# Patient Record
Sex: FEMALE | Race: WHITE
[De-identification: names, ages, dates, MRNs, and addresses within clinical notes are randomized per-mention and may not be internally consistent; named-entity substitution may affect disease eponyms.]

---

## 2017-03-20 ENCOUNTER — HOSPITAL ENCOUNTER (EMERGENCY)
Dept: HOSPITAL 47 - EC | Age: 77
Discharge: HOME | End: 2017-03-20
Payer: COMMERCIAL

## 2017-03-20 VITALS
SYSTOLIC BLOOD PRESSURE: 181 MMHG | DIASTOLIC BLOOD PRESSURE: 79 MMHG | RESPIRATION RATE: 18 BRPM | HEART RATE: 93 BPM | TEMPERATURE: 99.1 F
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
## 2017-03-20 DIAGNOSIS — Z88.8: ICD-10-CM

## 2017-03-20 DIAGNOSIS — Z79.899: ICD-10-CM
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
## 2017-03-20 DIAGNOSIS — Z23: ICD-10-CM

## 2017-03-20 DIAGNOSIS — S61.215A: Primary | ICD-10-CM

## 2017-03-20 DIAGNOSIS — Z88.1: ICD-10-CM

## 2017-03-20 PROCEDURE — 90715 TDAP VACCINE 7 YRS/> IM: CPT

## 2017-03-20 PROCEDURE — 90471 IMMUNIZATION ADMIN: CPT

## 2017-03-20 PROCEDURE — 99282 EMERGENCY DEPT VISIT SF MDM: CPT

## 2017-03-20 PROCEDURE — 12001 RPR S/N/AX/GEN/TRNK 2.5CM/<: CPT

## 2017-03-20 NOTE — ED
Wound/Laceration HPI





- General


Chief Complaint: Wound/Laceration


Stated Complaint: laceration


Time Seen by Provider: 03/20/17 13:08


Source: patient, RN notes reviewed


Mode of arrival: ambulatory


Limitations: no limitations





- History of Present Illness


Initial Comments: 





Patient is a 76-year-old female presents emergency room for evaluation of left 

finger laceration.  Patient states she was cutting watermelon and sliced her 

finger.  Patient denies any significant pain.  Patient denies any numbness or 

tingling in her finger.  Patient states she still has full range of motion of 

her finger.  Patient is not sure when her last tetanus vaccine was.  Patient 

denies any other injuries during incident.  Patient denies taking blood 

thinners.





- Related Data


 Home Medications











 Medication  Instructions  Recorded  Confirmed


 


Ascorbic Acid [Vitamin C] 500 mg PO DAILY 09/01/16 10/28/16


 


Multivit with Calcium,Iron,Min 1 each PO DAILY 09/01/16 10/28/16





[Women's Daily Multivitamin]   


 


Tums   1 tab PO AS DIRECTED PRN 10/28/16 10/28/16


 


Vitamin D (Unknown Dose) 1 cap PO DAILY 10/28/16 10/28/16











 Allergies











Allergy/AdvReac Type Severity Reaction Status Date / Time


 


levofloxacin [From Levaquin] Allergy  Rash/Hives Verified 10/28/16 13:32


 


Additives in inhalers Allergy  Rapid Uncoded 10/28/16 13:32





   Heart Rate  














Review of Systems


ROS Statement: 


Those systems with pertinent positive or pertinent negative responses have been 

documented in the HPI.





ROS Other: All systems not noted in ROS Statement are negative.





Past Medical History


Past Medical History: GERD/Reflux, Osteoarthritis (OA)


Additional Past Medical History / Comment(s): Seasonal allergies, Hiatal Hernia

, episode of tachycardia., neck pain in certain positions.


History of Any Multi-Drug Resistant Organisms: None Reported


Past Surgical History: Hysterectomy


Additional Past Surgical History / Comment(s): Colonoscopy, right cataract


Past Anesthesia/Blood Transfusion Reactions: Family History of Problems w/ 

Anesthesia


Additional Past Anesthesia/Blood Transfusion Reaction / Comment(s): States the 

top of her head was numb after cataract surgery.   Pt. states her mother's 

heart stopped during gallbladder surgery.


Past Psychological History: No Psychological Hx Reported


Smoking Status: Never smoker


Past Alcohol Use History: None Reported


Past Drug Use History: None Reported





- Past Family History


  ** Mother


Family Medical History: Coronary Artery Disease (CAD)





General Exam





- General Exam Comments


Initial Comments: 





Sitting in exam room in no acute distress.


Limitations: no limitations


General appearance: alert, in no apparent distress


Head exam: Present: atraumatic, normocephalic, normal inspection


Eye exam: Present: normal appearance


ENT exam: Present: normal exam


Neck exam: Present: normal inspection


Respiratory exam: Present: normal lung sounds bilaterally.  Absent: respiratory 

distress


Cardiovascular Exam: Present: regular rate, normal rhythm, normal heart sounds


  ** Left


Hand Wrist exam: Present: full ROM, laceration (2 cm open laceration on the 

palmar portion of the proximal phalanx of the fourth digit).  Absent: tenderness


Neuro motor exam: Present: wrist extension intact, thumb opposition intact, 

thumb IP flexion intact, thumb adduction intact, fingers 2-5 abduction intact


Vascular: Present: normal capillary refill (Capillary refill less than 2 seconds

), radial pulse (2+), ulnar pulse (2+)


Back exam: Present: normal inspection


Neurological exam: Present: alert, oriented X3, CN II-XII intact, normal gait


Psychiatric exam: Present: normal affect, normal mood


Skin exam: Present: warm, dry.  Absent: rash





Course


 Vital Signs











  03/20/17





  12:40


 


Temperature 99.1 F


 


Pulse Rate 93


 


Respiratory 18





Rate 


 


Blood Pressure 181/79


 


O2 Sat by Pulse 98





Oximetry 














Procedures





- Laceration


  ** Laceration #1


Consent Obtained: verbal consent


Indication: laceration


Site: other (left fourth digit)


Size (cm): 2


Description: linear


Depth: simple, single layer


Anesthetic Used: lidocaine 1%


Anesthesia Technique: local infiltration


Amount (mls): 1


Pre-repair: wound explored, irrigated extensively


Type of Sutures: nylon


Size of Sutures: 5-0


Number of Sutures: 7


Technique: simple, interrupted


Patient Tolerated Procedure: well, no complications





Medical Decision Making





- Medical Decision Making





Patient is 76-year-old female since emergency room for violation finger 

laceration.  Laceration repaired with sutures.  Patient updated on her tetanus 

vaccine.  Advised patient to return in 10-12 days for suture removal.  Patient 

has full range of motion of finger.  Strength intact.  Patient states she 

understand everything that was discussed with her.  Return parameters 

discussed.  Case discussed with Dr. Gonzalez.





Disposition


Clinical Impression: 


 Finger laceration





Disposition: HOME SELF-CARE


Condition: Good


Instructions:  Care For Your Stitches (ED), Laceration (ED)


Additional Instructions: 


Keep suture area clean and dry.  Clean suture area with a damp cloth.  Please 

return in 10-12 days for suture removal.  Take Tylenol or Motrin as needed for 

discomfort. Please follow up with primary care provider in 1-2 days. If any new 

symptom arises or symptoms worsen, return to ER as soon as possible.  


Referrals: 


RAYNA Fowler III, MD [Primary Care Provider] - 1-2 days


Time of Disposition: 14:31

## 2017-07-12 ENCOUNTER — HOSPITAL ENCOUNTER (OUTPATIENT)
Dept: HOSPITAL 47 - LABWHC1 | Age: 77
Discharge: HOME | End: 2017-07-12
Payer: MEDICARE

## 2017-07-12 DIAGNOSIS — Z48.24: ICD-10-CM

## 2017-07-12 DIAGNOSIS — R10.11: Primary | ICD-10-CM

## 2017-07-12 DIAGNOSIS — R35.0: ICD-10-CM

## 2017-07-12 DIAGNOSIS — S39.012A: ICD-10-CM

## 2017-07-12 DIAGNOSIS — N39.0: ICD-10-CM

## 2017-07-12 LAB
ALP SERPL-CCNC: 75 U/L (ref 38–126)
ALT SERPL-CCNC: 20 U/L (ref 9–52)
ANION GAP SERPL CALC-SCNC: 12 MMOL/L
AST SERPL-CCNC: 26 U/L (ref 14–36)
BASOPHILS # BLD AUTO: 0 K/UL (ref 0–0.2)
BASOPHILS NFR BLD AUTO: 1 %
BUN SERPL-SCNC: 14 MG/DL (ref 7–17)
CALCIUM SPEC-MCNC: 9.2 MG/DL (ref 8.4–10.2)
CH: 30.6
CHCM: 32.9
CHLORIDE SERPL-SCNC: 106 MMOL/L (ref 98–107)
CHOLEST SERPL-MCNC: 225 MG/DL (ref ?–200)
CO2 SERPL-SCNC: 23 MMOL/L (ref 22–30)
EOSINOPHIL # BLD AUTO: 0.1 K/UL (ref 0–0.7)
EOSINOPHIL NFR BLD AUTO: 2 %
ERYTHROCYTE [DISTWIDTH] IN BLOOD BY AUTOMATED COUNT: 4.49 M/UL (ref 3.8–5.4)
ERYTHROCYTE [DISTWIDTH] IN BLOOD: 13.9 % (ref 11.5–15.5)
GLUCOSE SERPL-MCNC: 88 MG/DL (ref 74–99)
HCT VFR BLD AUTO: 42 % (ref 34–46)
HDLC SERPL-MCNC: 65 MG/DL (ref 40–60)
HDW: 2.14
HGB BLD-MCNC: 14.3 GM/DL (ref 11.4–16)
LUC NFR BLD AUTO: 3 %
LYMPHOCYTES # SPEC AUTO: 1 K/UL (ref 1–4.8)
LYMPHOCYTES NFR SPEC AUTO: 24 %
MCH RBC QN AUTO: 31.8 PG (ref 25–35)
MCHC RBC AUTO-ENTMCNC: 34 G/DL (ref 31–37)
MCV RBC AUTO: 93.6 FL (ref 80–100)
MONOCYTES # BLD AUTO: 0.5 K/UL (ref 0–1)
MONOCYTES NFR BLD AUTO: 11 %
NEUTROPHILS # BLD AUTO: 2.4 K/UL (ref 1.3–7.7)
NEUTROPHILS NFR BLD AUTO: 59 %
NON-AFRICAN AMERICAN GFR(MDRD): >60
POTASSIUM SERPL-SCNC: 4.9 MMOL/L (ref 3.5–5.1)
PROT SERPL-MCNC: 7.3 G/DL (ref 6.3–8.2)
SODIUM SERPL-SCNC: 141 MMOL/L (ref 137–145)
TRIGL SERPL-MCNC: 83 MG/DL (ref ?–150)
WBC # BLD AUTO: 0.13 10*3/UL
WBC # BLD AUTO: 4.1 K/UL (ref 3.8–10.6)
WBC (PEROX): 4.13

## 2017-07-12 PROCEDURE — 80061 LIPID PANEL: CPT

## 2017-07-12 PROCEDURE — 36415 COLL VENOUS BLD VENIPUNCTURE: CPT

## 2017-07-12 PROCEDURE — 85025 COMPLETE CBC W/AUTO DIFF WBC: CPT

## 2017-07-12 PROCEDURE — 80053 COMPREHEN METABOLIC PANEL: CPT

## 2017-07-18 ENCOUNTER — HOSPITAL ENCOUNTER (OUTPATIENT)
Dept: HOSPITAL 47 - RADUSWWP | Age: 77
Discharge: HOME | End: 2017-07-18
Payer: MEDICARE

## 2017-07-18 DIAGNOSIS — R10.11: Primary | ICD-10-CM

## 2017-07-18 PROCEDURE — 76705 ECHO EXAM OF ABDOMEN: CPT

## 2017-07-18 NOTE — US
EXAMINATION TYPE: US abdomen limited

 

DATE OF EXAM: 7/18/2017

 

COMPARISON: CT from 2012

 

CLINICAL HISTORY: RUQ Pain R10.11.

 

EXAM MEASUREMENTS:

 

Liver Length:  10.9 cm   

Gallbladder Wall:  0.2 cm   

CBD:  0.4 cm

Right Kidney:  10.6 x 3.6 x 5.5 cm

 

Pancreas:  visualized portions wnl

Liver:  wnl  

Gallbladder:  No stones seen

**Evidence for sonographic Light's sign:  No

CBD:  wnl 

Right Kidney:  No hydronephrosis or masses seen as visualized, lower pole obscured by bowel 

 

Limited views of the pancreas are unremarkable.

 

The liver is normal in size without biliary dilatation.

 

The gallbladder is normal without evidence of cholelithiasis. The distal common hepatic duct measures
 4 mm. The gallbladder wall measures 2 mm. There is no sonographic Light's sign.

 

The right kidney is unremarkable.

 

IMPRESSION: 

 

NORMAL RIGHT UPPER QUADRANT ULTRASOUND.

## 2017-07-26 ENCOUNTER — HOSPITAL ENCOUNTER (INPATIENT)
Dept: HOSPITAL 47 - EC | Age: 77
LOS: 1 days | Discharge: HOME | DRG: 310 | End: 2017-07-27
Attending: HOSPITALIST | Admitting: HOSPITALIST
Payer: MEDICARE

## 2017-07-26 VITALS — RESPIRATION RATE: 16 BRPM

## 2017-07-26 DIAGNOSIS — Z88.8: ICD-10-CM

## 2017-07-26 DIAGNOSIS — R07.89: ICD-10-CM

## 2017-07-26 DIAGNOSIS — M19.90: ICD-10-CM

## 2017-07-26 DIAGNOSIS — Z82.49: ICD-10-CM

## 2017-07-26 DIAGNOSIS — E87.8: ICD-10-CM

## 2017-07-26 DIAGNOSIS — I11.0: ICD-10-CM

## 2017-07-26 DIAGNOSIS — E66.9: ICD-10-CM

## 2017-07-26 DIAGNOSIS — Z88.1: ICD-10-CM

## 2017-07-26 DIAGNOSIS — K44.9: ICD-10-CM

## 2017-07-26 DIAGNOSIS — E11.9: ICD-10-CM

## 2017-07-26 DIAGNOSIS — K21.9: ICD-10-CM

## 2017-07-26 DIAGNOSIS — I50.9: ICD-10-CM

## 2017-07-26 DIAGNOSIS — I48.91: Primary | ICD-10-CM

## 2017-07-26 LAB
ALP SERPL-CCNC: 81 U/L (ref 38–126)
ALT SERPL-CCNC: 42 U/L (ref 9–52)
ANION GAP SERPL CALC-SCNC: 11 MMOL/L
APTT BLD: 22.6 SEC (ref 22–30)
AST SERPL-CCNC: 33 U/L (ref 14–36)
BASOPHILS # BLD AUTO: 0 K/UL (ref 0–0.2)
BASOPHILS NFR BLD AUTO: 1 %
BUN SERPL-SCNC: 21 MG/DL (ref 7–17)
CALCIUM SPEC-MCNC: 8.9 MG/DL (ref 8.4–10.2)
CH: 31.1
CHCM: 33.1
CHLORIDE SERPL-SCNC: 109 MMOL/L (ref 98–107)
CK SERPL-CCNC: 101 U/L (ref 30–135)
CK SERPL-CCNC: 55 U/L (ref 30–135)
CK SERPL-CCNC: 78 U/L (ref 30–135)
CO2 SERPL-SCNC: 24 MMOL/L (ref 22–30)
EOSINOPHIL # BLD AUTO: 0.1 K/UL (ref 0–0.7)
EOSINOPHIL NFR BLD AUTO: 2 %
ERYTHROCYTE [DISTWIDTH] IN BLOOD BY AUTOMATED COUNT: 4.5 M/UL (ref 3.8–5.4)
ERYTHROCYTE [DISTWIDTH] IN BLOOD: 14.1 % (ref 11.5–15.5)
GLUCOSE SERPL-MCNC: 100 MG/DL (ref 74–99)
HCT VFR BLD AUTO: 42.5 % (ref 34–46)
HDW: 2.15
HGB BLD-MCNC: 14.4 GM/DL (ref 11.4–16)
INR PPP: 1 (ref ?–1.2)
LUC NFR BLD AUTO: 4 %
LYMPHOCYTES # SPEC AUTO: 1.4 K/UL (ref 1–4.8)
LYMPHOCYTES NFR SPEC AUTO: 27 %
MAGNESIUM SPEC-SCNC: 1.8 MG/DL (ref 1.6–2.3)
MCH RBC QN AUTO: 32 PG (ref 25–35)
MCHC RBC AUTO-ENTMCNC: 33.9 G/DL (ref 31–37)
MCV RBC AUTO: 94.5 FL (ref 80–100)
MONOCYTES # BLD AUTO: 0.5 K/UL (ref 0–1)
MONOCYTES NFR BLD AUTO: 9 %
NEUTROPHILS # BLD AUTO: 2.9 K/UL (ref 1.3–7.7)
NEUTROPHILS NFR BLD AUTO: 57 %
NON-AFRICAN AMERICAN GFR(MDRD): >60
PHOSPHATE SERPL-MCNC: 3.6 MG/DL (ref 2.5–4.5)
POTASSIUM SERPL-SCNC: 3.7 MMOL/L (ref 3.5–5.1)
PROT SERPL-MCNC: 6.9 G/DL (ref 6.3–8.2)
PT BLD: 10.2 SEC (ref 9–12)
SODIUM SERPL-SCNC: 144 MMOL/L (ref 137–145)
TROPONIN I SERPL-MCNC: 0.01 NG/ML (ref 0–0.03)
TROPONIN I SERPL-MCNC: <0.012 NG/ML (ref 0–0.03)
TROPONIN I SERPL-MCNC: <0.012 NG/ML (ref 0–0.03)
WBC # BLD AUTO: 0.19 10*3/UL
WBC # BLD AUTO: 5 K/UL (ref 3.8–10.6)
WBC (PEROX): 4.74

## 2017-07-26 PROCEDURE — 85025 COMPLETE CBC W/AUTO DIFF WBC: CPT

## 2017-07-26 PROCEDURE — 84443 ASSAY THYROID STIM HORMONE: CPT

## 2017-07-26 PROCEDURE — 93306 TTE W/DOPPLER COMPLETE: CPT

## 2017-07-26 PROCEDURE — 94760 N-INVAS EAR/PLS OXIMETRY 1: CPT

## 2017-07-26 PROCEDURE — 85610 PROTHROMBIN TIME: CPT

## 2017-07-26 PROCEDURE — 85730 THROMBOPLASTIN TIME PARTIAL: CPT

## 2017-07-26 PROCEDURE — 82553 CREATINE MB FRACTION: CPT

## 2017-07-26 PROCEDURE — 96368 THER/DIAG CONCURRENT INF: CPT

## 2017-07-26 PROCEDURE — 96376 TX/PRO/DX INJ SAME DRUG ADON: CPT

## 2017-07-26 PROCEDURE — 83735 ASSAY OF MAGNESIUM: CPT

## 2017-07-26 PROCEDURE — 96365 THER/PROPH/DIAG IV INF INIT: CPT

## 2017-07-26 PROCEDURE — 96366 THER/PROPH/DIAG IV INF ADDON: CPT

## 2017-07-26 PROCEDURE — 80053 COMPREHEN METABOLIC PANEL: CPT

## 2017-07-26 PROCEDURE — 93005 ELECTROCARDIOGRAM TRACING: CPT

## 2017-07-26 PROCEDURE — 82550 ASSAY OF CK (CPK): CPT

## 2017-07-26 PROCEDURE — 84484 ASSAY OF TROPONIN QUANT: CPT

## 2017-07-26 PROCEDURE — 36415 COLL VENOUS BLD VENIPUNCTURE: CPT

## 2017-07-26 PROCEDURE — 85049 AUTOMATED PLATELET COUNT: CPT

## 2017-07-26 PROCEDURE — 84100 ASSAY OF PHOSPHORUS: CPT

## 2017-07-26 PROCEDURE — 80061 LIPID PANEL: CPT

## 2017-07-26 PROCEDURE — 99291 CRITICAL CARE FIRST HOUR: CPT

## 2017-07-26 RX ADMIN — APIXABAN SCH MG: 5 TABLET, FILM COATED ORAL at 21:05

## 2017-07-26 RX ADMIN — APIXABAN SCH MG: 5 TABLET, FILM COATED ORAL at 14:52

## 2017-07-26 RX ADMIN — METOPROLOL TARTRATE SCH MG: 50 TABLET, FILM COATED ORAL at 21:05

## 2017-07-26 NOTE — P.CRDCN
History of Present Illness


Consult date: 07/26/17


History of present illness: 


76-year-old female with no significant past medical history except intermittent 

palpitations and " tachycardias", woke up around 5:00 last night with 

complaints of palpitations.  Patient has some chest tightness and shortness of 

breath.  Patient was given IV Cardizem  and she converted back to sinus rhythm.

  Patient is currently maintaining sinus rhythm and is feeling well.  She is on 

IV heparin.  She is being considered for Eliquis.  Her cardiac enzymes are 

negative.  Echocardiogram was done but results are pending at this time.  She 

doesn't seem to be in acute distress.








Review of Systems





REVIEW OF SYSTEMS:


CONSTITUTIONAL:.  Patient is doing well. No complaints of fever or chills


EYES: Denies diplopia, blurring of vision


EARS, NOSE, MOUTH, THROAT: Denies headaches, denies sore throat.


CARDIOVASCULAR: As per HPI


RESPIRATORY: Denies shortness of breath, denies cough.


GASTROINTESTINAL: Denies change in appetite, denies abdominal pain, denies 

diarrhea


GENITOURINARY: Denies hematuria, denies infections.


MUSKULOSKELETAL: Denies pain, denies swelling.  Denies any cramps or 

claudication


INTEGUMENTARY: Denies rash, denies eczema.


NEUROLOGICAL:  Denies  focal weakness, or visual disturbance.  Denies any 

dizziness or syncope


PSYCHIATRIC: Denies anxiety, denies depression.


HEMATOLOGIC/LYMPHATIC: Denies any bleeding, denies enlarged lymph nodes.








Past Medical History


Past Medical History: GERD/Reflux, Osteoarthritis (OA)


Additional Past Medical History / Comment(s): Seasonal allergies, Hiatal Hernia

, episode of tachycardia., neck pain in certain positions.boipsy on right foot 

and under left breast


History of Any Multi-Drug Resistant Organisms: None Reported


Past Surgical History: Hysterectomy


Additional Past Surgical History / Comment(s): Colonoscopy,  cataract removed 

for both eyes


Past Anesthesia/Blood Transfusion Reactions: Family History of Problems w/ 

Anesthesia


Additional Past Anesthesia/Blood Transfusion Reaction / Comment(s): States the 

top of her head was numb after cataract surgery.   Pt. states her mother's 

heart stopped during gallbladder surgery.


Smoking Status: Never smoker





- Past Family History


  ** Mother


Family Medical History: Coronary Artery Disease (CAD)





Medications and Allergies


 Home Medications











 Medication  Instructions  Recorded  Confirmed  Type


 


Multivit with Calcium,Iron,Min 1 tab PO DAILY 09/01/16 07/26/17 History





[Women's Daily Multivitamin]    


 


Areds 2 Vitamin 1 tab PO Q7D 07/26/17 07/26/17 History


 


Cholecalciferol [Vitamin D3] 1,000 unit PO DAILY 07/26/17 07/26/17 History











 Allergies











Allergy/AdvReac Type Severity Reaction Status Date / Time


 


levofloxacin [From Levaquin] Allergy  Rash/Hives Verified 07/26/17 07:29


 


Additives in inhalers Allergy  Rapid Uncoded 07/26/17 06:52





   Heart Rate  














Physical Exam


Vitals: 


 Vital Signs











  Temp Pulse Pulse Resp BP BP Pulse Ox


 


 07/26/17 14:00  97 F L   69  16   123/58  99


 


 07/26/17 13:15  98.9 F  70   18  119/60   97


 


 07/26/17 12:26  98.8 F  60   18  110/74   97


 


 07/26/17 11:26   62   18  122/60   62 L


 


 07/26/17 10:46   78   18  109/74   97


 


 07/26/17 10:26   82   18  128/59   100


 


 07/26/17 10:06   83   18  128/63   99


 


 07/26/17 09:46   88   18  121/57   98


 


 07/26/17 09:26   78   18  119/58   100


 


 07/26/17 09:06  98.0 F  98   18  117/58   99


 


 07/26/17 08:46   98   18  110/80   98


 


 07/26/17 08:26   98   18  103/52   100


 


 07/26/17 08:16   113 H   18  135/68   100


 


 07/26/17 08:12   120 H   18  132/70   99


 


 07/26/17 08:01   118 H   18  138/64   100


 


 07/26/17 07:56   112 H   18  138/70   100


 


 07/26/17 07:51   120 H   18  126/64   100


 


 07/26/17 07:46   114 H   18  133/77   100


 


 07/26/17 07:40   130 H     


 


 07/26/17 07:39   100   18  127/57   100


 


 07/26/17 07:35   128 H   18  110/58   100


 


 07/26/17 07:30   120 H   18  132/64   100


 


 07/26/17 07:16   114 H   20  129/60   100


 


 07/26/17 07:15   135 H   20  162/55   100


 


 07/26/17 07:13   142 H   20  158/82   99


 


 07/26/17 06:58  98.0 F  140 H   18  150/74   96


 


 07/26/17 06:55     17   


 


 07/26/17 06:49  97.8 F  128 H   18  189/90   100








 Intake and Output











 07/26/17 07/26/17 07/26/17





 06:59 14:59 22:59


 


Intake Total  89.529 


 


Balance  89.529 


 


Intake:   


 


  Intake, IV Titration  89.529 





  Amount   


 


    Diltiazem 125 mg In  9.833 





    Sodium Chloride 0.9% 100   





    ml @ 5 MG/HR 5 mls/hr IV   





    .Q24H ONE Rx#:[id number]   


 


    Heparin Sodium,Porcine/  79.696 





    D5w Pmx 25,000 unit In   





    Dextrose/Water 1 500ml.   





    bag @ 12 UNITS/KG/HR 16.   





    32 mls/hr IV .Q24H [facility] Rx   





    #:[id number]   


 


Other:   


 


  Voiding Method  Toilet 


 


  Weight 68.039 kg  














GENERAL EXAM: Patient is alert and oriented and doesn't appear to be in any 

acute distress


HEENT: Normocephalic. Normal reaction of pupils, equal size, normal range of 

extraocular motion. No erythema or exudates in the throat.


NECK: No masses, no nuchal rigidity.


CHEST: No chest wall deformity.


LUNGS: Equal air entry with no crackles or wheeze.


HEART: S1 and S2 normal with no audible mumurs or gallops. Regular rhythm, 

femorals equal on both sides..


ABDOMEN: No hepatosplenomegaly, normal bowel sounds, no guarding or rigidity.


SKIN: No rashes


CENTRAL NERVOUS SYSTEM: No focal deficits.


EXTREMITIES: No cyanosis, clubbing or edema.





Results





 07/26/17 07:09





 07/26/17 07:09


 Cardiac Enzymes











  07/26/17 07/26/17 07/26/17 Range/Units





  07:09 07:09 13:17 


 


AST   33   (14-36)  U/L


 


CK-MB (CK-2)  1.2   1.1  (0.0-2.4)  ng/mL


 


Troponin I  <0.012   0.015  (0.000-0.034)  ng/mL








 Coagulation











  07/26/17 07/26/17 Range/Units





  07:09 13:17 


 


PT  10.2   (9.0-12.0)  sec


 


APTT  22.6  59.2 H  (22.0-30.0)  sec








 CBC











  07/26/17 Range/Units





  07:09 


 


WBC  5.0  (3.8-10.6)  k/uL


 


RBC  4.50  (3.80-5.40)  m/uL


 


Hgb  14.4  (11.4-16.0)  gm/dL


 


Hct  42.5  (34.0-46.0)  %


 


Plt Count  201  (150-450)  k/uL








 Comprehensive Metabolic Panel











  07/26/17 Range/Units





  07:09 


 


Sodium  144  (137-145)  mmol/L


 


Potassium  3.7  (3.5-5.1)  mmol/L


 


Chloride  109 H  ()  mmol/L


 


Carbon Dioxide  24  (22-30)  mmol/L


 


BUN  21 H  (7-17)  mg/dL


 


Creatinine  0.68  (0.52-1.04)  mg/dL


 


Glucose  100 H  (74-99)  mg/dL


 


Calcium  8.9  (8.4-10.2)  mg/dL


 


AST  33  (14-36)  U/L


 


ALT  42  (9-52)  U/L


 


Alkaline Phosphatase  81  ()  U/L


 


Total Protein  6.9  (6.3-8.2)  g/dL


 


Albumin  4.1  (3.5-5.0)  g/dL








 Current Medications











Generic Name Dose Route Start Last Admin





  Trade Name Freq  PRN Reason Stop Dose Admin


 


Apixaban  5 mg  07/26/17 21:00  07/26/17 14:52





  Eliquis  PO   5 mg





  BID PARISA   Administration


 


Aspirin  325 mg  07/27/17 09:00  





  Aspirin  PO   





  DAILY [facility]   


 


Atorvastatin Calcium  80 mg  07/27/17 09:00  





  Lipitor  PO   





  DAILY [facility]   


 


Sodium Chloride  1,000 mls @ 100 mls/hr  07/26/17 07:19  07/26/17 07:26





  Saline 0.9%  IV  07/26/17 17:18  100 mls/hr





  .Q10H [facility]   Administration


 


Metoprolol Tartrate  50 mg  07/26/17 21:00  





  Lopressor  PO   





  BID [facility]   


 


Nitroglycerin  0.4 mg  07/26/17 09:19  





  Nitrostat  SUBLINGUAL   





  Q5M PRN   





  Chest Pain   








 Intake and Output











 07/26/17 07/26/17 07/26/17





 06:59 14:59 22:59


 


Intake Total  89.529 


 


Balance  89.529 


 


Intake:   


 


  Intake, IV Titration  89.529 





  Amount   


 


    Diltiazem 125 mg In  9.833 





    Sodium Chloride 0.9% 100   





    ml @ 5 MG/HR 5 mls/hr IV   





    .Q24H [facility] Rx#:[id number]   


 


    Heparin Sodium,Porcine/  79.696 





    D5w Pmx 25,000 unit In   





    Dextrose/Water 1 500ml.   





    bag @ 12 UNITS/KG/HR 16.   





    32 mls/hr IV .Q24H [facility] Rx   





    #:[id number]   


 


Other:   


 


  Voiding Method  Toilet 


 


  Weight 68.039 kg  








 





 07/26/17 07:09 





 07/26/17 07:09 











EKG Interpretations (text)





Atrial fibrillation with rapid ventricular response





Assessment and Plan


(1) Atrial fibrillation with RVR


Status: Acute   





(2) Chest pain


Status: Acute   


Plan: 


Patient is maintaining sinus rhythm at this time and is feeling well.  We'll 

wait for the reports of the echocardiogram.  If patient remains stable she 

could be discharged home tomorrow on metoprolol and anticoagulant . Follow-up 

in the office in one week.

## 2017-07-26 NOTE — P.HPIM
History of Present Illness





76-year-old female came in with compensative heart racing and palpitations, and 

chest pressure like sensation patient does not have any significant past 

medical history.  Patient is found to be in atrial fibrillation presently sinus 

rhythm on 20 20 mg IV piggyback off Cardizem.  Patient had chest pressure like 

sensation.  Patient was treated for urinary tract infection last week.  Patient 

denied any fever presently patient denied any specific pain dysuria.  Patient 

denied any nausea vomiting.  Patient is not dehydrated at this point of time 

creatinine within normal limits.  And patient is being switched to metoprolol 

50 twice a day, will check for eliquis  approval, she is approved 

eliquispatient will be discharged on eliquis.  Echo cardiac exam will be 

obtained to assess LV function and any valvular abnormalities.  Patient denied 

any shortness of breath orthopnea or PND.





Review of Systems





REVIEW OF SYSTEMS: 


CONSTITUTIONAL: No fever, no malaise, no fatigue. 


HEENT: No recent visual problems or hearing problems. Denied any sore throat. 


CARDIOVASCULAR: No orthopnea, PND, , no syncope. 


PULMONARY: No shortness of breath, no cough, no hemoptysis. 


GASTROINTESTINAL: No diarrhea, no nausea, no vomiting, no abdominal pain. 

Normoactive bowel sounds. 


NEUROLOGICAL: No headaches, no weakness, no numbness. 


HEMATOLOGICAL: Denies any bleeding or petechiae. 


GENITOURINARY: Denies any burning micturition, frequency, or urgency. 


MUSCULOSKELETAL/RHEUMATOLOGICAL: Denies any joint pain, swelling, or any muscle 

pain. 


ENDOCRINE: Denies any polyuria or polydipsia. 





The rest of the 14-point review of systems is negative.











Past Medical History


Past Medical History: GERD/Reflux, Osteoarthritis (OA)


Additional Past Medical History / Comment(s): Seasonal allergies, Hiatal Hernia

, episode of tachycardia., neck pain in certain positions.


History of Any Multi-Drug Resistant Organisms: None Reported


Past Surgical History: Hysterectomy


Additional Past Surgical History / Comment(s): Colonoscopy, right cataract


Past Anesthesia/Blood Transfusion Reactions: Family History of Problems w/ 

Anesthesia


Additional Past Anesthesia/Blood Transfusion Reaction / Comment(s): States the 

top of her head was numb after cataract surgery.   Pt. states her mother's 

heart stopped during gallbladder surgery.


Past Psychological History: No Psychological Hx Reported


Smoking Status: Never smoker


Past Alcohol Use History: None Reported


Past Drug Use History: None Reported





- Past Family History


  ** Mother


Family Medical History: Coronary Artery Disease (CAD)





Medications and Allergies


 Home Medications











 Medication  Instructions  Recorded  Confirmed  Type


 


Multivit with Calcium,Iron,Min 1 tab PO DAILY 09/01/16 07/26/17 History





[Women's Daily Multivitamin]    


 


Areds 2 Vitamin 1 tab PO Q7D 07/26/17 07/26/17 History


 


Cholecalciferol [Vitamin D3] 1,000 unit PO DAILY 07/26/17 07/26/17 History











 Allergies











Allergy/AdvReac Type Severity Reaction Status Date / Time


 


levofloxacin [From Levaquin] Allergy  Rash/Hives Verified 07/26/17 07:29


 


Additives in inhalers Allergy  Rapid Uncoded 07/26/17 06:52





   Heart Rate  














Physical Exam


Vitals: 


 Vital Signs











  Temp Pulse Resp BP Pulse Ox


 


 07/26/17 13:15  98.9 F  70  18  119/60  97


 


 07/26/17 12:26  98.8 F  60  18  110/74  97


 


 07/26/17 11:26   62  18  122/60  62 L


 


 07/26/17 10:46   78  18  109/74  97


 


 07/26/17 10:26   82  18  128/59  100


 


 07/26/17 10:06   83  18  128/63  99


 


 07/26/17 09:46   88  18  121/57  98


 


 07/26/17 09:26   78  18  119/58  100


 


 07/26/17 09:06  98.0 F  98  18  117/58  99


 


 07/26/17 08:46   98  18  110/80  98


 


 07/26/17 08:26   98  18  103/52  100


 


 07/26/17 08:16   113 H  18  135/68  100


 


 07/26/17 08:12   120 H  18  132/70  99


 


 07/26/17 08:01   118 H  18  138/64  100


 


 07/26/17 07:56   112 H  18  138/70  100


 


 07/26/17 07:51   120 H  18  126/64  100


 


 07/26/17 07:46   114 H  18  133/77  100


 


 07/26/17 07:40   130 H   


 


 07/26/17 07:39   100  18  127/57  100


 


 07/26/17 07:35   128 H  18  110/58  100


 


 07/26/17 07:30   120 H  18  132/64  100


 


 07/26/17 07:16   114 H  20  129/60  100


 


 07/26/17 07:15   135 H  20  162/55  100


 


 07/26/17 07:13   142 H  20  158/82  99


 


 07/26/17 06:58  98.0 F  140 H  18  150/74  96


 


 07/26/17 06:55    17  


 


 07/26/17 06:49  97.8 F  128 H  18  189/90  100








 Intake and Output











 07/25/17 07/26/17 07/26/17





 22:59 06:59 14:59


 


Intake Total   9.833


 


Balance   9.833


 


Intake:   


 


  Intake, IV Titration   9.833





  Amount   


 


    Diltiazem 125 mg In   9.833





    Sodium Chloride 0.9% 100   





    ml @ 5 MG/HR 5 mls/hr IV   





    .Q24H [facility] Rx#:[id number]   


 


Other:   


 


  Weight  68.039 kg 














PHYSICAL EXAMINATION: 





GENERAL: The patient is alert and oriented x3, not in any acute distress. Well 

developed, well nourished. 


HEENT: Pupils are round and equally reacting to light. EOMI. No scleral 

icterus. No conjunctival pallor. Normocephalic, atraumatic. No pharyngeal 

erythema. No thyromegaly. 


CARDIOVASCULAR: S1 and S2 present. No murmurs, rubs, or gallops. 


PULMONARY: Chest is clear to auscultation, no wheezing or crackles. 


ABDOMEN: Soft, nontender, nondistended, normoactive bowel sounds. No palpable 

organomegaly. 


MUSCULOSKELETAL: No joint swelling or deformity.


EXTREMITIES: No cyanosis, clubbing, or pedal edema. 


NEUROLOGICAL: Gross neurological examination did not reveal any focal deficits. 


SKIN: No rashes. 








Results


CBC & Chem 7: 


 07/26/17 07:09





 07/26/17 07:09


Labs: 


 Abnormal Lab Results - Last 24 Hours (Table)











  07/26/17 07/26/17 Range/Units





  07:09 13:17 


 


APTT   59.2 H  (22.0-30.0)  sec


 


Chloride  109 H   ()  mmol/L


 


BUN  21 H   (7-17)  mg/dL


 


Glucose  100 H   (74-99)  mg/dL














Assessment and Plan


Plan: 





1 New-onset atrial fibrillation: Patient will be started on metoprolol and 

Cardizem will will be discontinued.  Anticoagulations as mentioned in the 

interval history. TSH was ordered.


#2 hyperchloremia due to IV fluids which will be discontinued. 


 #3 chest pain probably related to palpitations.

## 2017-07-26 NOTE — ED
General Adult HPI





- General


Chief complaint: Chest Pain


Stated complaint: Chest Pain


Time Seen by Provider: 07/26/17 07:13


Source: patient, RN notes reviewed, old records reviewed


Mode of arrival: wheelchair


Limitations: no limitations





- History of Present Illness


Initial comments: 





This is a 76-year-old female to the ER for evaluation.  Presents today for 

evaluation of heart racing and palpitations.  Patient denies formal diagnosis.  

Patient states she had some heart racing when she was younger.  Couple episodes 

of the past week where she felt like she was going pass out but denies chest 

pain or shortness of breath.  No new medications, no change in medications.  No 

fevers or cough or congestion.  No recent travel history or sick contacts





- Related Data


 Home Medications











 Medication  Instructions  Recorded  Confirmed


 


Multivit with Calcium,Iron,Min 1 tab PO DAILY 09/01/16 07/26/17





[Women's Daily Multivitamin]   


 


Areds 2 Vitamin 1 tab PO Q7D 07/26/17 07/26/17


 


Cholecalciferol [Vitamin D3] 1,000 unit PO DAILY 07/26/17 07/26/17











 Allergies











Allergy/AdvReac Type Severity Reaction Status Date / Time


 


levofloxacin [From Levaquin] Allergy  Rash/Hives Verified 07/26/17 07:29


 


Additives in inhalers Allergy  Rapid Uncoded 07/26/17 06:52





   Heart Rate  














Review of Systems


ROS Statement: 


Those systems with pertinent positive or pertinent negative responses have been 

documented in the HPI.





ROS Other: All systems not noted in ROS Statement are negative.





Past Medical History


Past Medical History: GERD/Reflux, Osteoarthritis (OA)


Additional Past Medical History / Comment(s): Seasonal allergies, Hiatal Hernia

, episode of tachycardia., neck pain in certain positions.


History of Any Multi-Drug Resistant Organisms: None Reported


Past Surgical History: Hysterectomy


Additional Past Surgical History / Comment(s): Colonoscopy, right cataract


Past Anesthesia/Blood Transfusion Reactions: Family History of Problems w/ 

Anesthesia


Additional Past Anesthesia/Blood Transfusion Reaction / Comment(s): States the 

top of her head was numb after cataract surgery.   Pt. states her mother's 

heart stopped during gallbladder surgery.


Past Psychological History: No Psychological Hx Reported


Smoking Status: Never smoker


Past Alcohol Use History: None Reported


Past Drug Use History: None Reported





- Past Family History


  ** Mother


Family Medical History: Coronary Artery Disease (CAD)





General Exam


Limitations: no limitations


General appearance: alert, in no apparent distress, anxious


Head exam: Present: atraumatic, normocephalic, normal inspection


Eye exam: Present: normal appearance, PERRL, EOMI.  Absent: scleral icterus, 

conjunctival injection, periorbital swelling


ENT exam: Present: normal exam, mucous membranes moist


Neck exam: Present: normal inspection.  Absent: tenderness, meningismus, 

lymphadenopathy


Respiratory exam: Present: normal lung sounds bilaterally.  Absent: respiratory 

distress, wheezes, rales, rhonchi, stridor


Cardiovascular Exam: Present: tachycardia, irregular rhythm, normal heart 

sounds.  Absent: systolic murmur, diastolic murmur, rubs, gallop, clicks


GI/Abdominal exam: Present: soft, normal bowel sounds.  Absent: distended, 

tenderness, guarding, rebound, rigid


Extremities exam: Present: normal inspection, full ROM, normal capillary 

refill.  Absent: tenderness, pedal edema, joint swelling, calf tenderness


Back exam: Present: normal inspection


Neurological exam: Present: alert, oriented X3, CN II-XII intact


Psychiatric exam: Present: normal affect, normal mood


Skin exam: Present: warm, dry, intact, normal color.  Absent: rash





Course


 Vital Signs











  07/26/17 07/26/17 07/26/17





  06:49 06:55 06:58


 


Temperature 97.8 F  98.0 F


 


Pulse Rate 128 H  140 H


 


Respiratory 18 17 18





Rate   


 


Blood Pressure 189/90  150/74


 


O2 Sat by Pulse 100  96





Oximetry   














  07/26/17 07/26/17 07/26/17





  07:13 07:15 07:16


 


Temperature   


 


Pulse Rate 142 H 135 H 114 H


 


Respiratory 20 20 20





Rate   


 


Blood Pressure 158/82 162/55 129/60


 


O2 Sat by Pulse 99 100 100





Oximetry   














  07/26/17 07/26/17 07/26/17





  07:30 07:35 07:39


 


Temperature   


 


Pulse Rate 120 H 128 H 100


 


Respiratory 18 18 18





Rate   


 


Blood Pressure 132/64 110/58 127/57


 


O2 Sat by Pulse 100 100 100





Oximetry   














  07/26/17 07/26/17 07/26/17





  07:40 08:12 08:16


 


Temperature   


 


Pulse Rate 130 H 120 H 113 H


 


Respiratory  18 18





Rate   


 


Blood Pressure  132/70 135/68


 


O2 Sat by Pulse  99 100





Oximetry   














- Reevaluation(s)


Reevaluation #1: 





07/26/17 09:22


Patient having adequate rate control at this time.


Reevaluation #2: 





07/26/17 09:22


Discussed with family greater than 50 minutes regarding diagnosis, prognosis, 

questions answered





EKG Findings





- EKG Comments:


EKG Findings:: EKG shows A. fib with RVR rate 153, QRS 66, QTc 463





Medical Decision Making





- Medical Decision Making





76 female in the ER for evaluation of palpitations heart racing, positive A. 

fib with RVR.  Patient will be admitted for cardiology evaluation.  Telemetry 

and trending of troponins





- Lab Data


Result diagrams: 


 07/26/17 07:09





 07/26/17 07:09


 Lab Results











  07/26/17 07/26/17 07/26/17 Range/Units





  07:09 07:09 07:09 


 


WBC   5.0   (3.8-10.6)  k/uL


 


RBC   4.50   (3.80-5.40)  m/uL


 


Hgb   14.4   (11.4-16.0)  gm/dL


 


Hct   42.5   (34.0-46.0)  %


 


MCV   94.5   (80.0-100.0)  fL


 


MCH   32.0   (25.0-35.0)  pg


 


MCHC   33.9   (31.0-37.0)  g/dL


 


RDW   14.1   (11.5-15.5)  %


 


Plt Count   201   (150-450)  k/uL


 


Neutrophils %   57   %


 


Lymphocytes %   27   %


 


Monocytes %   9   %


 


Eosinophils %   2   %


 


Basophils %   1   %


 


Neutrophils #   2.9   (1.3-7.7)  k/uL


 


Lymphocytes #   1.4   (1.0-4.8)  k/uL


 


Monocytes #   0.5   (0-1.0)  k/uL


 


Eosinophils #   0.1   (0-0.7)  k/uL


 


Basophils #   0.0   (0-0.2)  k/uL


 


PT     (9.0-12.0)  sec


 


INR     (<1.2)  


 


APTT     (22.0-30.0)  sec


 


Sodium    144  (137-145)  mmol/L


 


Potassium    3.7  (3.5-5.1)  mmol/L


 


Chloride    109 H  ()  mmol/L


 


Carbon Dioxide    24  (22-30)  mmol/L


 


Anion Gap    11  mmol/L


 


BUN    21 H  (7-17)  mg/dL


 


Creatinine    0.68  (0.52-1.04)  mg/dL


 


Est GFR (MDRD) Af Amer    >60  (>60 ml/min/1.73 sqM)  


 


Est GFR (MDRD) Non-Af    >60  (>60 ml/min/1.73 sqM)  


 


Glucose    100 H  (74-99)  mg/dL


 


Calcium    8.9  (8.4-10.2)  mg/dL


 


Phosphorus    3.6  (2.5-4.5)  mg/dL


 


Magnesium    1.8  (1.6-2.3)  mg/dL


 


Total Bilirubin    0.6  (0.2-1.3)  mg/dL


 


AST    33  (14-36)  U/L


 


ALT    42  (9-52)  U/L


 


Alkaline Phosphatase    81  ()  U/L


 


Total Creatine Kinase  78    ()  U/L


 


CK-MB (CK-2)  1.2    (0.0-2.4)  ng/mL


 


CK-MB (CK-2) Rel Index  1.5    


 


Troponin I  <0.012    (0.000-0.034)  ng/mL


 


Total Protein    6.9  (6.3-8.2)  g/dL


 


Albumin    4.1  (3.5-5.0)  g/dL


 


TSH    3.580  (0.465-4.680)  mIU/L














  07/26/17 Range/Units





  07:09 


 


WBC   (3.8-10.6)  k/uL


 


RBC   (3.80-5.40)  m/uL


 


Hgb   (11.4-16.0)  gm/dL


 


Hct   (34.0-46.0)  %


 


MCV   (80.0-100.0)  fL


 


MCH   (25.0-35.0)  pg


 


MCHC   (31.0-37.0)  g/dL


 


RDW   (11.5-15.5)  %


 


Plt Count   (150-450)  k/uL


 


Neutrophils %   %


 


Lymphocytes %   %


 


Monocytes %   %


 


Eosinophils %   %


 


Basophils %   %


 


Neutrophils #   (1.3-7.7)  k/uL


 


Lymphocytes #   (1.0-4.8)  k/uL


 


Monocytes #   (0-1.0)  k/uL


 


Eosinophils #   (0-0.7)  k/uL


 


Basophils #   (0-0.2)  k/uL


 


PT  10.2  (9.0-12.0)  sec


 


INR  1.0  (<1.2)  


 


APTT  22.6  (22.0-30.0)  sec


 


Sodium   (137-145)  mmol/L


 


Potassium   (3.5-5.1)  mmol/L


 


Chloride   ()  mmol/L


 


Carbon Dioxide   (22-30)  mmol/L


 


Anion Gap   mmol/L


 


BUN   (7-17)  mg/dL


 


Creatinine   (0.52-1.04)  mg/dL


 


Est GFR (MDRD) Af Amer   (>60 ml/min/1.73 sqM)  


 


Est GFR (MDRD) Non-Af   (>60 ml/min/1.73 sqM)  


 


Glucose   (74-99)  mg/dL


 


Calcium   (8.4-10.2)  mg/dL


 


Phosphorus   (2.5-4.5)  mg/dL


 


Magnesium   (1.6-2.3)  mg/dL


 


Total Bilirubin   (0.2-1.3)  mg/dL


 


AST   (14-36)  U/L


 


ALT   (9-52)  U/L


 


Alkaline Phosphatase   ()  U/L


 


Total Creatine Kinase   ()  U/L


 


CK-MB (CK-2)   (0.0-2.4)  ng/mL


 


CK-MB (CK-2) Rel Index   


 


Troponin I   (0.000-0.034)  ng/mL


 


Total Protein   (6.3-8.2)  g/dL


 


Albumin   (3.5-5.0)  g/dL


 


TSH   (0.465-4.680)  mIU/L














- Radiology Data


Radiology results: report reviewed (Chest x-ray is negative for acute disease), 

image reviewed





Critical Care Time


Critical Care Time: Yes


Total Critical Care Time: 31





Disposition


Clinical Impression: 


 Chest pain, Atrial fibrillation with RVR





Disposition: ADMITTED AS IP TO THIS HOSP


Condition: Fair


Instructions:  Chest Pain (ED)


Referrals: 


RAYNA Fowler III, MD [Primary Care Provider] - 1-2 days

## 2017-07-27 VITALS — DIASTOLIC BLOOD PRESSURE: 60 MMHG | HEART RATE: 73 BPM | TEMPERATURE: 97 F | SYSTOLIC BLOOD PRESSURE: 127 MMHG

## 2017-07-27 LAB
CHOLEST SERPL-MCNC: 195 MG/DL (ref ?–200)
HDLC SERPL-MCNC: 53 MG/DL (ref 40–60)
TRIGL SERPL-MCNC: 68 MG/DL (ref ?–150)

## 2017-07-27 RX ADMIN — METOPROLOL TARTRATE SCH MG: 50 TABLET, FILM COATED ORAL at 09:32

## 2017-07-27 RX ADMIN — APIXABAN SCH MG: 5 TABLET, FILM COATED ORAL at 09:32

## 2017-07-27 NOTE — ECHOF
Referral Reason:medications



MEASUREMENTS

--------

HEIGHT: 165.1 cm

WEIGHT: 68.0 kg

BP: 128/59

RVIDd:   2.2 cm     (< 3.3)

IVSd:   1.0 cm     (0.6 - 1.1)

LVIDd:   3.8 cm     (3.9 - 5.3)

LVPWd:   1.0 cm     (0.6 - 1.1)

IVSs:   1.3 cm

LVIDs:   2.8 cm

LVPWs:   1.2 cm

LA Diam:   2.7 cm     (2.7 - 3.8)

LAESV Index (A-L):   22.84 ml/m

Ao Diam:   2.8 cm     (2.0 - 3.7)

AV Cusp:   1.8 cm     (1.5 - 2.6)

LA Diam:   3.2 cm     (2.7 - 3.8)

MV EXCURSION:   18.395 mm     (> 18.000)

MV EF SLOPE:   59 mm/s     (70 - 150)

EPSS:   0.6 cm

MV E Cornelius:   0.90 m/s

MV DecT:   309 ms

MV A Cornelius:   1.17 m/s

MV E/A Ratio:   0.77 

RAP:   5.00 mmHg

RVSP:   23.57 mmHg







FINDINGS

--------

Sinus rhythm.

This was a technically good study.

LV size, wall thickness and systolic function are 

normal, with an EF greater than 55%.

The right ventricle is normal in size.

Normal LA  size by volume 22+/-6 ml/m2.

The right atrial size is normal.

There is mild aortic valve sclerosis.   There is no 

evidence of aortic regurgitation.

Mild mitral annular calcification present.   Mild 

mitral regurgitation is present.

Mild tricuspid regurgitation present.   There is no 

evidence of pulmonary hypertension.   The right 

ventricular systolic pressure, as measured by Doppler, 

is 23.57mmHg.

There is no pulmonic regurgitation present.

The aortic root size is normal.

There is no pericardial effusion.



CONCLUSIONS

--------

1. LV size, wall thickness and systolic function are 

normal, with an EF greater than 55%.

2. There is mild aortic valve sclerosis.

3. Mild mitral annular calcification present.

4. Mild mitral regurgitation is present.

5. Mild tricuspid regurgitation present.

6. There is no evidence of pulmonary hypertension.

7. The right ventricular systolic pressure, as measured by 

Doppler, is 23.57mmHg.





SONOGRAPHER: Tatianna Villagomez RDCS

## 2017-07-27 NOTE — P.PN
Subjective


Principal diagnosis: 





A NOREEN talbot





76-year-old female with no significant past medical history except intermittent 

palpitations and " tachycardias", woke up around 5:00 last night with 

complaints of palpitations.  Patient has some chest tightness and shortness of 

breath.  Patient was given IV Cardizem  and she converted back to sinus rhythm.

  Patient is currently maintaining sinus rhythm and is feeling well.    She is 

being considered for Eliquis.  Her cardiac enzymes are negative.  

Echocardiogram with Doppler study was performed which revealed an ejection 

fraction of greater than 55%.  From cardiology's perspective, she may be able 

to be discharged home today.  We will make her a follow-up appointment to see 

Dr. Murry in the office post discharge.





Objective





- Vital Signs


Vital signs: 


 Vital Signs











Temp  97 F L  07/27/17 09:25


 


Pulse  73   07/27/17 09:25


 


Resp  16   07/27/17 09:25


 


BP  127/60   07/27/17 09:25


 


Pulse Ox  95   07/27/17 09:25








 Intake & Output











 07/26/17 07/27/17 07/27/17





 18:59 06:59 18:59


 


Intake Total 89.529 60 245


 


Output Total  400 


 


Balance 89.529 -340 245


 


Intake:   


 


  Intake, IV Titration 89.529  





  Amount   


 


    Diltiazem 125 mg In 9.833  





    Sodium Chloride 0.9% 100   





    ml @ 5 MG/HR 5 mls/hr IV   





    .Q24H [facility] Rx#:[id number]   


 


    Heparin Sodium,Porcine/ 79.696  





    D5w Pmx 25,000 unit In   





    Dextrose/Water 1 500ml.   





    bag @ 12 UNITS/KG/HR 16.   





    32 mls/hr IV .Q24H [facility] Rx   





    #:[id number]   


 


  Oral  60 245


 


Output:   


 


  Urine  400 


 


Other:   


 


  Voiding Method Toilet Toilet Toilet


 


  # Voids  1 














- Exam





PHYSICAL EXAMINATION: 





HEENT: [Head is atraumatic, normocephalic.  Pupils equal, round.  Neck is 

supple.  There is no elevated jugular venous pressure.]





HEART EXAMINATION: [Heart S1, S2 normal.  No murmur or gallop heard.]





CHEST EXAMINATION:[ Lungs are clear to auscultation and precussion. No chest 

wall tenderness is noted on palpation or with deep breathing.]





ABDOMEN: [ Soft, nontender. Bowel sounds are heard. No organomegaly noted].


 


EXTREMITIES:[ 2+ peripheral pulses with no evidence of peripheral edema and no 

calf tenderness noted].





NEUROLOGIC [patient is awake, alert and oriented -3.]


 


.


 








- Labs


CBC & Chem 7: 


 07/27/17 06:07





 07/26/17 07:09


Labs: 


 Abnormal Lab Results - Last 24 Hours (Table)











  07/27/17 Range/Units





  06:07 


 


LDL Cholesterol, Calc  128 H  (0-99)  mg/dL














Assessment and Plan


Plan: 





 Assessment and Plan 


Plan: 


Assessment and plan


#1 atrial fibrillation with rapid ventricular response, appears to be of new 

onset for this patient.  TSH normal


#2 congestive cardiac failure, LV function unknown at this time.


#3 hypertension


#4 diabetes


# 5 obesity


#6 UTI, 





From cardiology's perspective, patient may be able to be discharged home today.

  We will make her a follow-up appointment to see Dr. Murry in the office 

post discharge.


DNP note has been reviewed, I agree with a documented findings and plan of 

care.  Patient was seen and examined.

## 2017-07-27 NOTE — P.DS
Providers


Date of admission: 


07/26/17 09:22





Attending physician: 


Akin Carranza





Consults: 





 





07/26/17 09:19


Consult Physician Urgent 


   Consulting Provider: Suraj Barnett


   Consult Reason/Comments: afib


   Do you want consulting provider notified?: Yes











Primary care physician: 


RAYNA Fowler





[facility] Course: 





76-year-old female admitted with new-onset atrial fibrillation patient is rate 

controlled at this point of time and patient is sinus rhythm patient was 

started on metoprolol 50 twice a day and patient was started on apixaban, echo 

cardiac exam showed normal ejection fractionand valvular abnormalities.  

Patient is being discharged today to follow up with Dr. Murry as an 

outpatient.


Patient Condition at Discharge: Fair





Plan - Discharge Summary


New Discharge Prescriptions: 


New


   Apixaban [Eliquis] 5 mg PO BID #60 tab


   Metoprolol Tartrate [Lopressor] 50 mg PO BID #60 tab





Continue


   Multivit with Calcium,Iron,Min [Women's Daily Multivitamin] 1 tab PO DAILY


   Cholecalciferol [Vitamin D3] 1,000 unit PO DAILY


   Areds 2 Vitamin 1 tab PO Q7D


Discharge Medication List





Multivit with Calcium,Iron,Min [Women's Daily Multivitamin] 1 tab PO DAILY 09/01 /16 [History]


Areds 2 Vitamin 1 tab PO Q7D 07/26/17 [History]


Cholecalciferol [Vitamin D3] 1,000 unit PO DAILY 07/26/17 [History]


Apixaban [Eliquis] 5 mg PO BID #60 tab 07/27/17 [Rx]


Metoprolol Tartrate [Lopressor] 50 mg PO BID #60 tab 07/27/17 [Rx]








Follow up Appointment(s)/Referral(s): 


RAYNA Fowler III, MD [Primary Care Provider] - 3 Days


Edison Murry MD [STAFF PHYSICIAN] - 08/02/17 3:45 pm


Patient Instructions/Handouts:  Chest Pain (ED)


Activity/Diet/Wound Care/Special Instructions: 


Free 30 days of Eliquis filled in [facility] OP pharmacy.


Discharge Disposition: HOME SELF-CARE

## 2018-02-19 ENCOUNTER — HOSPITAL ENCOUNTER (OUTPATIENT)
Dept: HOSPITAL 47 - EC | Age: 78
Setting detail: OBSERVATION
Discharge: HOME | End: 2018-02-19
Attending: HOSPITALIST | Admitting: HOSPITALIST
Payer: MEDICARE

## 2018-02-19 VITALS — HEART RATE: 77 BPM | DIASTOLIC BLOOD PRESSURE: 65 MMHG | TEMPERATURE: 98 F | SYSTOLIC BLOOD PRESSURE: 119 MMHG

## 2018-02-19 VITALS — BODY MASS INDEX: 24.3 KG/M2

## 2018-02-19 VITALS — RESPIRATION RATE: 16 BRPM

## 2018-02-19 DIAGNOSIS — K21.9: ICD-10-CM

## 2018-02-19 DIAGNOSIS — Z88.1: ICD-10-CM

## 2018-02-19 DIAGNOSIS — R00.2: ICD-10-CM

## 2018-02-19 DIAGNOSIS — R10.11: ICD-10-CM

## 2018-02-19 DIAGNOSIS — N39.0: ICD-10-CM

## 2018-02-19 DIAGNOSIS — Z79.01: ICD-10-CM

## 2018-02-19 DIAGNOSIS — M19.90: ICD-10-CM

## 2018-02-19 DIAGNOSIS — R07.9: ICD-10-CM

## 2018-02-19 DIAGNOSIS — K44.9: ICD-10-CM

## 2018-02-19 DIAGNOSIS — R00.0: ICD-10-CM

## 2018-02-19 DIAGNOSIS — Z87.891: ICD-10-CM

## 2018-02-19 DIAGNOSIS — Z82.49: ICD-10-CM

## 2018-02-19 DIAGNOSIS — Z88.8: ICD-10-CM

## 2018-02-19 DIAGNOSIS — Z79.899: ICD-10-CM

## 2018-02-19 DIAGNOSIS — I48.0: ICD-10-CM

## 2018-02-19 DIAGNOSIS — R10.13: Primary | ICD-10-CM

## 2018-02-19 LAB
ALBUMIN SERPL-MCNC: 4.1 G/DL (ref 3.5–5)
ALP SERPL-CCNC: 71 U/L (ref 38–126)
ALT SERPL-CCNC: 17 U/L (ref 9–52)
AMYLASE SERPL-CCNC: 110 U/L (ref 30–110)
ANION GAP SERPL CALC-SCNC: 10 MMOL/L
APTT BLD: 23.5 SEC (ref 22–30)
AST SERPL-CCNC: 30 U/L (ref 14–36)
BASOPHILS # BLD AUTO: 0 K/UL (ref 0–0.2)
BASOPHILS NFR BLD AUTO: 1 %
BUN SERPL-SCNC: 18 MG/DL (ref 7–17)
CALCIUM SPEC-MCNC: 9.7 MG/DL (ref 8.4–10.2)
CHLORIDE SERPL-SCNC: 106 MMOL/L (ref 98–107)
CK SERPL-CCNC: 35 U/L (ref 30–135)
CK SERPL-CCNC: 37 U/L (ref 30–135)
CK SERPL-CCNC: 43 U/L (ref 30–135)
CO2 SERPL-SCNC: 29 MMOL/L (ref 22–30)
EOSINOPHIL # BLD AUTO: 0.3 K/UL (ref 0–0.7)
EOSINOPHIL NFR BLD AUTO: 5 %
ERYTHROCYTE [DISTWIDTH] IN BLOOD BY AUTOMATED COUNT: 4.77 M/UL (ref 3.8–5.4)
ERYTHROCYTE [DISTWIDTH] IN BLOOD: 13.2 % (ref 11.5–15.5)
GLUCOSE SERPL-MCNC: 122 MG/DL (ref 74–99)
HCT VFR BLD AUTO: 44.4 % (ref 34–46)
HGB BLD-MCNC: 14.3 GM/DL (ref 11.4–16)
INR PPP: 1.1 (ref ?–1.2)
LIPASE SERPL-CCNC: 160 U/L (ref 23–300)
LYMPHOCYTES # SPEC AUTO: 1.4 K/UL (ref 1–4.8)
LYMPHOCYTES NFR SPEC AUTO: 22 %
MAGNESIUM SPEC-SCNC: 2 MG/DL (ref 1.6–2.3)
MCH RBC QN AUTO: 30.1 PG (ref 25–35)
MCHC RBC AUTO-ENTMCNC: 32.2 G/DL (ref 31–37)
MCV RBC AUTO: 93.2 FL (ref 80–100)
MONOCYTES # BLD AUTO: 0.8 K/UL (ref 0–1)
MONOCYTES NFR BLD AUTO: 13 %
NEUTROPHILS # BLD AUTO: 3.5 K/UL (ref 1.3–7.7)
NEUTROPHILS NFR BLD AUTO: 55 %
PH UR: 6.5 [PH] (ref 5–8)
PLATELET # BLD AUTO: 195 K/UL (ref 150–450)
POTASSIUM SERPL-SCNC: 3.6 MMOL/L (ref 3.5–5.1)
PROT SERPL-MCNC: 7.2 G/DL (ref 6.3–8.2)
PT BLD: 10.4 SEC (ref 9–12)
RBC UR QL: 2 /HPF (ref 0–5)
SODIUM SERPL-SCNC: 145 MMOL/L (ref 137–145)
SP GR UR: 1.01 (ref 1–1.03)
SQUAMOUS UR QL AUTO: 1 /HPF (ref 0–4)
T4 FREE SERPL-MCNC: 1.52 NG/DL (ref 0.78–2.19)
TROPONIN I SERPL-MCNC: <0.012 NG/ML (ref 0–0.03)
UROBILINOGEN UR QL STRIP: <2 MG/DL (ref ?–2)
WBC # BLD AUTO: 6.3 K/UL (ref 3.8–10.6)
WBC #/AREA URNS HPF: 8 /HPF (ref 0–5)

## 2018-02-19 PROCEDURE — 85610 PROTHROMBIN TIME: CPT

## 2018-02-19 PROCEDURE — 71045 X-RAY EXAM CHEST 1 VIEW: CPT

## 2018-02-19 PROCEDURE — 84484 ASSAY OF TROPONIN QUANT: CPT

## 2018-02-19 PROCEDURE — 83735 ASSAY OF MAGNESIUM: CPT

## 2018-02-19 PROCEDURE — 81001 URINALYSIS AUTO W/SCOPE: CPT

## 2018-02-19 PROCEDURE — 83690 ASSAY OF LIPASE: CPT

## 2018-02-19 PROCEDURE — 96374 THER/PROPH/DIAG INJ IV PUSH: CPT

## 2018-02-19 PROCEDURE — 84439 ASSAY OF FREE THYROXINE: CPT

## 2018-02-19 PROCEDURE — 85730 THROMBOPLASTIN TIME PARTIAL: CPT

## 2018-02-19 PROCEDURE — 82150 ASSAY OF AMYLASE: CPT

## 2018-02-19 PROCEDURE — 84443 ASSAY THYROID STIM HORMONE: CPT

## 2018-02-19 PROCEDURE — 76705 ECHO EXAM OF ABDOMEN: CPT

## 2018-02-19 PROCEDURE — 36415 COLL VENOUS BLD VENIPUNCTURE: CPT

## 2018-02-19 PROCEDURE — 99285 EMERGENCY DEPT VISIT HI MDM: CPT

## 2018-02-19 PROCEDURE — 82550 ASSAY OF CK (CPK): CPT

## 2018-02-19 PROCEDURE — 93270 REMOTE 30 DAY ECG REV/REPORT: CPT

## 2018-02-19 PROCEDURE — 80053 COMPREHEN METABOLIC PANEL: CPT

## 2018-02-19 PROCEDURE — 93271 ECG/MONITORING AND ANALYSIS: CPT

## 2018-02-19 PROCEDURE — 82553 CREATINE MB FRACTION: CPT

## 2018-02-19 PROCEDURE — 93005 ELECTROCARDIOGRAM TRACING: CPT

## 2018-02-19 PROCEDURE — 85025 COMPLETE CBC W/AUTO DIFF WBC: CPT

## 2018-02-19 NOTE — ED
Arrhythmia/Palpitations HPI





- General


Chief Complaint: Arrhythmia/Palpitations


Stated Complaint: Palpitations/A-Fib


Time Seen by Provider: 18 01:07


Source: patient


Mode of arrival: wheelchair


Limitations: no limitations





- History of Present Illness


MD Complaint: rapid heart beat


Onset/Timin


-: hour(s)


Context: occurred during rest


Arrhythmia History: atrial fibrillation


Associated Symptoms: nausea/vomiting, other (Epigastric pain)





- Related Data


 Home Medications











 Medication  Instructions  Recorded  Confirmed


 


Multivit with Calcium,Iron,Min 1 tab PO DAILY 16





[Women's Daily Multivitamin]   


 


Areds 2 Vitamin 1 tab PO Q7D 17


 


Cholecalciferol [Vitamin D3] 1,000 unit PO DAILY 17








 Previous Rx's











 Medication  Instructions  Recorded


 


Apixaban [Eliquis] 5 mg PO BID #60 tab 17


 


Metoprolol Tartrate [Lopressor] 50 mg PO BID #60 tab 17











 Allergies











Allergy/AdvReac Type Severity Reaction Status Date / Time


 


levofloxacin [From Levaquin] Allergy  Rash/Hives Verified 18 01:13


 


Additives in inhalers Allergy  Rapid Uncoded 18 01:13





   Heart Rate  














Review of Systems


ROS Statement: 


Those systems with pertinent positive or pertinent negative responses have been 

documented in the HPI.





ROS Other: All systems not noted in ROS Statement are negative.


Constitutional: Denies: fever, chills, weakness


Respiratory: Denies: cough, dyspnea


Cardiovascular: Reports: palpitations.  Denies: dyspnea on exertion, orthopnea, 

edema, syncope


Gastrointestinal: Reports: abdominal pain, nausea.  Denies: vomiting, diarrhea, 

melena, hematochezia


Genitourinary: Denies: dysuria, hematuria


Musculoskeletal: Denies: back pain


Skin: Denies: rash


Neurological: Denies: headache





Past Medical History


Past Medical History: Atrial Fibrillation, GERD/Reflux, Osteoarthritis (OA)


Additional Past Medical History / Comment(s): Seasonal allergies, Hiatal Hernia

, episode of tachycardia., neck pain in certain positions.boipsy on right foot 

and under left breast


History of Any Multi-Drug Resistant Organisms: None Reported


Past Surgical History: Hysterectomy


Additional Past Surgical History / Comment(s): Colonoscopy,  cataract removed 

for both eyes


Past Anesthesia/Blood Transfusion Reactions: Family History of Problems w/ 

Anesthesia


Additional Past Anesthesia/Blood Transfusion Reaction / Comment(s): States the 

top of her head was numb after cataract surgery.   Pt. states her mother's 

heart stopped during gallbladder surgery.


Past Psychological History: No Psychological Hx Reported


Smoking Status: Never smoker


Past Alcohol Use History: None Reported


Past Drug Use History: None Reported





- Past Family History


  ** Mother


Family Medical History: Coronary Artery Disease (CAD)





General Exam


Limitations: no limitations


General appearance: alert, in no apparent distress


Head exam: Present: atraumatic, normocephalic


Eye exam: Present: normal appearance.  Absent: scleral icterus, conjunctival 

injection


ENT exam: Present: normal oropharynx


Neck exam: Present: normal inspection, full ROM


Respiratory exam: Present: normal lung sounds bilaterally.  Absent: respiratory 

distress, wheezes, rales, rhonchi, stridor


Cardiovascular Exam: Present: regular rate, normal rhythm, normal heart sounds.

  Absent: systolic murmur, diastolic murmur, rubs, gallop


GI/Abdominal exam: Present: soft, tenderness (Epigastric).  Absent: distended, 

guarding, rebound, rigid, mass, pulsatile mass, hernia


Extremities exam: Present: normal inspection, normal capillary refill.  Absent: 

pedal edema, calf tenderness


Back exam: Present: normal inspection.  Absent: CVA tenderness (R), CVA 

tenderness (L)


Neurological exam: Present: alert


Skin exam: Present: warm, dry, intact, normal color.  Absent: rash





Course


 Vital Signs











  18





  01:08 02:32 02:42


 


Temperature 98.3 F  


 


Pulse Rate 118 H 96 87


 


Respiratory 20 18 18





Rate   


 


Blood Pressure 167/76 118/58 115/55


 


O2 Sat by Pulse 99 98 98





Oximetry   














EKG Findings





- EKG Results:


EKG: interpreted by ERMD, sinus rhythm, normal axis, normal ST/T


EKG shows: tachycardia (Rate approximately 106)





- MI, Pacemaker, Normal:


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 

in lead 3 consistent with possible old inferior infarct.)





Medical Decision Making





- Lab Data


Result diagrams: 


 18 01:45





 18 01:45


 Lab Results











  18 Range/Units





  01:45 01:45 01:45 


 


WBC   6.3   (3.8-10.6)  k/uL


 


RBC   4.77   (3.80-5.40)  m/uL


 


Hgb   14.3   (11.4-16.0)  gm/dL


 


Hct   44.4   (34.0-46.0)  %


 


MCV   93.2   (80.0-100.0)  fL


 


MCH   30.1   (25.0-35.0)  pg


 


MCHC   32.2   (31.0-37.0)  g/dL


 


RDW   13.2   (11.5-15.5)  %


 


Plt Count   195   (150-450)  k/uL


 


Neutrophils %   55   %


 


Lymphocytes %   22   %


 


Monocytes %   13   %


 


Eosinophils %   5   %


 


Basophils %   1   %


 


Neutrophils #   3.5   (1.3-7.7)  k/uL


 


Lymphocytes #   1.4   (1.0-4.8)  k/uL


 


Monocytes #   0.8   (0-1.0)  k/uL


 


Eosinophils #   0.3   (0-0.7)  k/uL


 


Basophils #   0.0   (0-0.2)  k/uL


 


PT     (9.0-12.0)  sec


 


INR     (<1.2)  


 


APTT     (22.0-30.0)  sec


 


Sodium    145  (137-145)  mmol/L


 


Potassium    3.6  (3.5-5.1)  mmol/L


 


Chloride    106  ()  mmol/L


 


Carbon Dioxide    29  (22-30)  mmol/L


 


Anion Gap    10  mmol/L


 


BUN    18 H  (7-17)  mg/dL


 


Creatinine    0.70  (0.52-1.04)  mg/dL


 


Est GFR (MDRD) Af Amer    >60  (>60 ml/min/1.73 sqM)  


 


Est GFR (MDRD) Non-Af    >60  (>60 ml/min/1.73 sqM)  


 


Glucose    122 H  (74-99)  mg/dL


 


Calcium    9.7  (8.4-10.2)  mg/dL


 


Magnesium    2.0  (1.6-2.3)  mg/dL


 


Total Bilirubin    0.3  (0.2-1.3)  mg/dL


 


AST    30  (14-36)  U/L


 


ALT    17  (9-52)  U/L


 


Alkaline Phosphatase    71  ()  U/L


 


Total Creatine Kinase  43    ()  U/L


 


CK-MB (CK-2)  0.5    (0.0-2.4)  ng/mL


 


CK-MB (CK-2) Rel Index  1.2    


 


Troponin I  <0.012    (0.000-0.034)  ng/mL


 


Total Protein    7.2  (6.3-8.2)  g/dL


 


Albumin    4.1  (3.5-5.0)  g/dL


 


Amylase    110  ()  U/L


 


Lipase    160  ()  U/L














  18 Range/Units





  01:45 


 


WBC   (3.8-10.6)  k/uL


 


RBC   (3.80-5.40)  m/uL


 


Hgb   (11.4-16.0)  gm/dL


 


Hct   (34.0-46.0)  %


 


MCV   (80.0-100.0)  fL


 


MCH   (25.0-35.0)  pg


 


MCHC   (31.0-37.0)  g/dL


 


RDW   (11.5-15.5)  %


 


Plt Count   (150-450)  k/uL


 


Neutrophils %   %


 


Lymphocytes %   %


 


Monocytes %   %


 


Eosinophils %   %


 


Basophils %   %


 


Neutrophils #   (1.3-7.7)  k/uL


 


Lymphocytes #   (1.0-4.8)  k/uL


 


Monocytes #   (0-1.0)  k/uL


 


Eosinophils #   (0-0.7)  k/uL


 


Basophils #   (0-0.2)  k/uL


 


PT  10.4  (9.0-12.0)  sec


 


INR  1.1  (<1.2)  


 


APTT  23.5  (22.0-30.0)  sec


 


Sodium   (137-145)  mmol/L


 


Potassium   (3.5-5.1)  mmol/L


 


Chloride   ()  mmol/L


 


Carbon Dioxide   (22-30)  mmol/L


 


Anion Gap   mmol/L


 


BUN   (7-17)  mg/dL


 


Creatinine   (0.52-1.04)  mg/dL


 


Est GFR (MDRD) Af Amer   (>60 ml/min/1.73 sqM)  


 


Est GFR (MDRD) Non-Af   (>60 ml/min/1.73 sqM)  


 


Glucose   (74-99)  mg/dL


 


Calcium   (8.4-10.2)  mg/dL


 


Magnesium   (1.6-2.3)  mg/dL


 


Total Bilirubin   (0.2-1.3)  mg/dL


 


AST   (14-36)  U/L


 


ALT   (9-52)  U/L


 


Alkaline Phosphatase   ()  U/L


 


Total Creatine Kinase   ()  U/L


 


CK-MB (CK-2)   (0.0-2.4)  ng/mL


 


CK-MB (CK-2) Rel Index   


 


Troponin I   (0.000-0.034)  ng/mL


 


Total Protein   (6.3-8.2)  g/dL


 


Albumin   (3.5-5.0)  g/dL


 


Amylase   ()  U/L


 


Lipase   ()  U/L














Disposition


Clinical Impression: 


 Chest pain, Tachycardia





Disposition: HOME SELF-CARE


Condition: Good


Referrals: 


Patric Montoya DO [Primary Care Provider] - 1-2 days

## 2018-02-19 NOTE — HP
HISTORY AND PHYSICAL



This is a combination history and physical and discharge summary.



HISTORY AND PHYSICAL/DISCHARGE SUMMARY:



CHIEF COMPLAINTS:

Epigastric pain and palpitations.



HISTORY OF PRESENT ILLNESS:

This 77-year-old woman with a past medical history of paroxysmal atrial ablation
, DJD,

history of GERD, being followed by Cardiology and Dr. Montoya in the outpatient 
setting

was having epigastric palpitations and as well as epigastric discomfort.  
Patient came

to [facility] and was admitted for further evaluation and treatment.  
The

telemetry did not show any acute arrhythmia.  Cardiology evaluated the patient 
and

recommended outpatient followup with event monitor.  There is no history of 
fever,

rigors or chills.  No history of headache, loss of consciousness or seizures.  
The

patient had evidence of mild UTI.



PAST MEDICAL HISTORY:

Atrial fibrillation, GERD, DJD, seasonal allergies.



MEDICATIONS PRIOR TO ADMISSION:

Include home medications:

1. Vitamin C.

3. MiraLAX 17 g daily.

4. Multivitamins one daily.

5. Lopressor 25 mg p.o. b.i.d.

6. Vitamin D 3000 daily.

7. Eliquis 5 mg p.o. daily.



ALLERGIES:

ARE LEVAQUIN AND ADDITIVES.



FAMILY HISTORY:

History of coronary artery disease in the family.



SOCIAL HISTORY:

Previous history of smoking.  No history of current smoking. No  alcohol intake.



REVIEW OF SYSTEMS:

ENT: No diminished hearing or vision.

CARDIOVASCULAR: As mentioned earlier.  Respirations: As mentioned earlier.  GI 
as

mentioned earlier.  no dysuria or retention.  Nervous System: No numbness or

weakness.

ALLERGY/IMMUNOLOGY:  No asthma or hayfever.  Musculoskeletal: As mentioned 
earlier.

Hematology/Oncology: No history of anemia.  Endocrine: No history of 
hypothyroidism or

diabetes mellitus.  Constitutional: As mentioned earlier.  Dermatology: 
Negative.

Rheumatology: Negative.  Psychiatric: As mentioned earlier.



PHYSICAL EXAMINATION:

The patient is alert and oriented times three. Pulse 77, blood pressure 119/65,

respirations 16, temperature 98 degrees, pulse ox 94% on room air.

HEENT:  Conjunctivae normal.  Oral mucosa moist.  Neck is no jugular venous 
distention.

No carotid bruit. No lymph node enlargement. Cardiovascular system:  S1, S2 
muffled. No

S3, no S4.  Respiratory: Breath sounds diminished in the bases.  No rhonchi.  No

crackles.

ABDOMEN:  Soft, nontender.  No mass palpable.  Some mild diffuse discomfort in 
the

epigastrium.  No guarding.  No rigidity.  No mass palpable.  Nervous system: 
Higher

functions as mentioned earlier.  Moves all four limbs.  No focal deficits. 
Lymphatics:

No lymph nodes palpable in the neck, axillae or groin.  Skin:  No ulcer, rash or

bleeding.



LABS:

CBC within normal limits and glucose 122.



ASSESSMENT:

1. Epigastric pain, possible gastroesophageal reflux disease.

2. Palpitations.  No evidence of arrhythmia.

3. Normal abdominal ultrasound.

4. Paroxysmal atrial fibrillation.

5. Gastroesophageal reflux disease.

6. History of degenerative joint disease.

7. History of hiatal hernia.



RECOMMENDATIONS AND DISCUSSION:

In this 77-year-old woman who presented with multiple complex medical issues, 
we will

monitor the patient closely, continue the current medications, symptomatic 
management

and treatment.  Otherwise, cardiologist saw the patient and recommend the 
patient be

discharged and the patient is being discharged in stable condition.



DISCHARGE ADVICE AND MEDICATIONS:

1. Diet is cardiac, soft, bland.

2. Activity limited until follow up.

3. Follow up with Dr. Montoya in 2-3 days.

4. Follow up with cardiologist as advised.

5. Follow up with Gastroenterology for further evaluation and treatment.

Medications to be as follows:

1. Eliquis 5 mg p.o. b.i.d.

2. Vitamin D3 1000 daily.

3. Lopressor 25 mg p.o. b.i.d.

4. Multivitamin 1 p.o. daily.

5. Protonix 40 mg p.o. b.i.d.

6. MiraLAX 17 g daily.

7. Bactrim DS 1 p.o. b.i.d.

8. Vitamin C, E-zinc 1 p.o. daily.

Once again, the patient is being discharged in stable condition with guarded 
prognosis.





MMODL / IJN: [id number] / Job#: [id number]

MTDD

## 2018-02-19 NOTE — P.CRDCN
History of Present Illness


Consult date: 02/19/18


Consult reason: atrial fibrillation


History of present illness: 


Mrs. Andrade is a pleasant 77-year-old  female past medical history 

significant for paroxysmal atrial fibrillation on long term anticoagulation 

with Eliquis, hiatal hernia and gastroesophageal reflux disease. She follows 

with Dr. Murry in the office. We have been asked to see her in 

consultation for complaints of palpitations. She states last night around 2200 

she woke up with burning in the epigastric region, which she sufferes from 

regularly. She got up to eat some crackers to subside the pain and then she 

started feeling her heart rate skipping around and beating rapidly. She denies 

associated sob, chest pain, diaphoresis, nausea or vomiting. The pain in her 

epigastric region has persisted and is still somewhat there at this time. The 

pain radiates to the right upper quadrant and is worse with deep palpiation. 

She denies ever having had actual pain in her chest, arm, back or neck. 





EKG on arrival reveals sinus mechanism with non-specific T-wave changes 

inferiorly with heart rate 106.  


Chest xray is negative for an acute cardiopulmonary process. 


Laboratory data reviewed, hemoglobin 14.3, platelets 195, potassium 3.6, 

magnesium 2.0, cardiac enzymes negative 2. 


Current cardiac medications include Eliquis 5 mg twice a day and Lopressor 25 

mg twice a day.





Most recent Cardiolite stress test from August 2017 reveals no evidence of 

reversible cardiac ischemia. 


Most recent echocardiogram 7/2017 reveals preserved LV systolic function with 

EF 55%, mild MR, mild TR and mild aortic valve sclerosis. 





Review of Systems





CONSTITUTIONAL: Denies fever. Denies chills.


EYES: Denies blurred vision. Denies vision changes. Denies eye pain.


EARS, NOSE, MOUTH & THROAT: Denies headache. Denies sore throat. Denies ear 

pain.


CARDIOVASCULAR: Denies chest pain. Denies shortness of breath. Denies 

orthopnea. Denies PND. Denies palpitations.


RESPIRATORY: Denies cough. 


GASTROINTESTINAL: Complains of epigastric and right upper quadrant abdominal 

pain. Denies diarrhea. Denies constipation. Denies nausea. Denies vomiting.


MUSCULOSKELETAL: Denies myalgias.


INTEGUMENTARY: Denies pruitis. Denies rash.


NEUROLOGIC: Denies numbness. Denies tingling. Denies weakness.


PSYCHIATRIC: Denies anxiety. Denies depression.


ENDOCRINE: Denies fatigue. Denies weight change. Denies polydipsia. Denies 

polyurina.


GENITOURINARY: Denies burning, hematuria or urgency with micturation.


HEMATOLOGIC: Denies history of anemia. Denies bleeding. 








Past Medical History


Past Medical History: Atrial Fibrillation, GERD/Reflux, Osteoarthritis (OA)


Additional Past Medical History / Comment(s): Seasonal allergies, Hiatal Hernia

, episode of tachycardia., neck pain in certain positions.boipsy on right foot 

and under left breast


History of Any Multi-Drug Resistant Organisms: None Reported


Past Surgical History: Hysterectomy


Additional Past Surgical History / Comment(s): Colonoscopy,  cataract removed 

for both eyes


Past Anesthesia/Blood Transfusion Reactions: Family History of Problems w/ 

Anesthesia


Additional Past Anesthesia/Blood Transfusion Reaction / Comment(s): States the 

top of her head was numb after cataract surgery.   Pt. states her mother's 

heart stopped during gallbladder surgery.


Past Psychological History: No Psychological Hx Reported


Smoking Status: Former smoker


Past Alcohol Use History: None Reported


Past Drug Use History: None Reported





- Past Family History


  ** Mother


Family Medical History: Coronary Artery Disease (CAD)





Medications and Allergies


 Home Medications











 Medication  Instructions  Recorded  Confirmed  Type


 


Multivit with Calcium,Iron,Min 1 tab PO DAILY 09/01/16 02/19/18 History





[Women's Daily Multivitamin]    


 


Cholecalciferol [Vitamin D3] 1,000 unit PO DAILY 07/26/17 02/19/18 History


 


Apixaban [Eliquis] 5 mg PO BID #60 tab 07/27/17 02/19/18 Rx


 


Metoprolol Tartrate [Lopressor] 25 mg PO BID 02/19/18 02/19/18 History


 


Polyethylene Glycol 3350 [Miralax] 17 gm PO DAILY 02/19/18 02/19/18 History


 


Vit C/E/Zn/Coppr/Lutein/Zeaxan 1 cap PO FR 02/19/18 02/19/18 History





[Preservision Areds 2 Softgel]    











 Allergies











Allergy/AdvReac Type Severity Reaction Status Date / Time


 


levofloxacin [From Levaquin] Allergy  Rash/Hives Verified 02/19/18 07:45


 


Additives in inhalers Allergy  Rapid Uncoded 02/19/18 07:45





   Heart Rate  














Physical Exam


Vitals: 


 Vital Signs











  Temp Pulse Pulse Resp BP BP Pulse Ox


 


 02/19/18 08:00  98.2 F   102 H  16   142/61  98


 


 02/19/18 04:34  97.7 F   97  16   157/72  97


 


 02/19/18 04:00     16   


 


 02/19/18 03:38   90   16  134/60   100


 


 02/19/18 02:42   87   18  115/55   98


 


 02/19/18 02:32   96   18  118/58   98


 


 02/19/18 01:08  98.3 F  118 H   20  167/76   99








 Intake and Output











 02/18/18 02/19/18 02/19/18





 22:59 06:59 14:59


 


Other:   


 


  Voiding Method  Toilet 


 


  # Voids  1 


 


  Weight  66.3 kg 














Blood pressure 142/61 heart rate 102 afebrile


GENERAL: This is a 77-year-old  female in no apparent distress at the 

time of my examination.


HEENT: Head is atraumatic, normocephalic. Pupils are equal, round. Sclerae 

anicteric. Conjunctivae are clear. Mucous membranes of the mouth are moist. 

Neck is supple. There is no jugular venous distention. No carotid bruit is 

heard.


LUNGS: Clear to auscultation no wheezes, rales or rhonchi. No chest wall 

tenderness is noted on palpation or with deep breathing.


HEART: Regular rate and rhythm without murmurs, rubs or gallops. S1 and S2 

heard.


ABDOMEN: Soft, nontender. Bowel sounds are heard. No organomegaly noted.


EXTREMITIES: No evidence of peripheral edema and no calf tenderness noted.


VASCULAR: Radial and dorsalis pedis pulses palpated, no evidence of clubbing.  


NEUROLOGIC: Patient is awake, alert and oriented x3.


 








Results





 02/19/18 01:45





 02/19/18 01:45


 Cardiac Enzymes











  02/19/18 02/19/18 Range/Units





  01:45 01:45 


 


AST   30  (14-36)  U/L


 


CK-MB (CK-2)  0.5   (0.0-2.4)  ng/mL


 


Troponin I  <0.012   (0.000-0.034)  ng/mL








 Coagulation











  02/19/18 Range/Units





  01:45 


 


PT  10.4  (9.0-12.0)  sec


 


APTT  23.5  (22.0-30.0)  sec








 CBC











  02/19/18 Range/Units





  01:45 


 


WBC  6.3  (3.8-10.6)  k/uL


 


RBC  4.77  (3.80-5.40)  m/uL


 


Hgb  14.3  (11.4-16.0)  gm/dL


 


Hct  44.4  (34.0-46.0)  %


 


Plt Count  195  (150-450)  k/uL








 Comprehensive Metabolic Panel











  02/19/18 Range/Units





  01:45 


 


Sodium  145  (137-145)  mmol/L


 


Potassium  3.6  (3.5-5.1)  mmol/L


 


Chloride  106  ()  mmol/L


 


Carbon Dioxide  29  (22-30)  mmol/L


 


BUN  18 H  (7-17)  mg/dL


 


Creatinine  0.70  (0.52-1.04)  mg/dL


 


Glucose  122 H  (74-99)  mg/dL


 


Calcium  9.7  (8.4-10.2)  mg/dL


 


AST  30  (14-36)  U/L


 


ALT  17  (9-52)  U/L


 


Alkaline Phosphatase  71  ()  U/L


 


Total Protein  7.2  (6.3-8.2)  g/dL


 


Albumin  4.1  (3.5-5.0)  g/dL








 Current Medications











Generic Name Dose Route Start Last Admin





  Trade Name Freq  PRN Reason Stop Dose Admin


 


Apixaban  5 mg  02/19/18 09:00  





  Eliquis  PO   





  BID [facility]   


 


Cholecalciferol  1,000 unit  02/19/18 09:00  





  Vitamin D3  PO   





  DAILY [facility]   


 


Metoprolol Tartrate  50 mg  02/19/18 09:00  





  Lopressor  PO   





  BID [facility]   


 


Multivitamins  1 each  02/19/18 09:00  





  Theragran  PO   





  DAILY [facility]   


 


Nitroglycerin  0.4 mg  02/19/18 03:40  





  Nitrostat  SUBLINGUAL   





  Q5M PRN   





  Chest Pain   








 Intake and Output











 02/18/18 02/19/18 02/19/18





 22:59 06:59 14:59


 


Other:   


 


  Voiding Method  Toilet 


 


  # Voids  1 


 


  Weight  66.3 kg 








 





 02/19/18 01:45 





 02/19/18 01:45 











Assessment and Plan


Assessment: 





ASSESSMENT


1.  Palpitations


2.  History of paroxysmal atrial fibrillation on long-term anticoagulation


3.  Epigastric burning with radiation to the right upper quadrant


4.  Gastroesophageal reflux disease


5.  Hiatal hernia





PLAN


Check TSH.


Telemetry tracings have been unremarkable for any episodes of atrial 

fibrillation as far.  Set up for a 30 day event monitor to be worn upon 

discharge. Follow-up with Dr. Murry once completed. 


Consider possible GI evaluation for ongoing persistent symptoms of 

gastroesophageal reflux disease.


Thank you kindly for this consultation.





Nurse Practitioner note has been reviewed, I agree with a documented findings 

and plan of care.  Patient was seen and examined.

## 2018-02-19 NOTE — XR
EXAMINATION TYPE: XR chest 1V portable

 

DATE OF EXAM: 2/19/2018

 

COMPARISON: NONE

 

HISTORY: Dysrhythmia

 

TECHNIQUE: Single frontal view of the chest is obtained.

 

FINDINGS:  There is no heart failure nor confluent pneumonic infiltrate. Costophrenic angles are cat
r. Thoracic aorta is atheromatous. There are chest leads.

 

IMPRESSION:  No active cardiopulmonary disease. Atheromatous aorta.

## 2018-04-02 NOTE — EM
EVENT MONITOR



This is an event monitor for palpitations.



This is a 30-day event monitor for palpitations.  No ECG strips available from this 30-

day event monitor.  Apparently, the patient took the monitor off and did not wear it.

I spoke to the stress lab staff and was informed that the patient did not wear this

monitor at all and therefore no strips were available.  No monitor strips are available

for evaluation.





MMODL / IJN: [id number] / Job#: [id number]

## 2018-09-17 ENCOUNTER — HOSPITAL ENCOUNTER (OUTPATIENT)
Dept: HOSPITAL 47 - LABWHC1 | Age: 78
End: 2018-09-17
Attending: FAMILY MEDICINE
Payer: MEDICARE

## 2018-09-17 DIAGNOSIS — I48.0: Primary | ICD-10-CM

## 2018-09-17 LAB
ALT SERPL-CCNC: 24 U/L (ref 9–52)
ANION GAP SERPL CALC-SCNC: 8 MMOL/L
AST SERPL-CCNC: 28 U/L (ref 14–36)
BASOPHILS # BLD AUTO: 0 K/UL (ref 0–0.2)
BASOPHILS NFR BLD AUTO: 1 %
BUN SERPL-SCNC: 15 MG/DL (ref 7–17)
CALCIUM SPEC-MCNC: 9.4 MG/DL (ref 8.4–10.2)
CHLORIDE SERPL-SCNC: 103 MMOL/L (ref 98–107)
CHOLEST SERPL-MCNC: 239 MG/DL (ref ?–200)
CO2 SERPL-SCNC: 30 MMOL/L (ref 22–30)
EOSINOPHIL # BLD AUTO: 0.1 K/UL (ref 0–0.7)
EOSINOPHIL NFR BLD AUTO: 2 %
ERYTHROCYTE [DISTWIDTH] IN BLOOD BY AUTOMATED COUNT: 4.58 M/UL (ref 3.8–5.4)
ERYTHROCYTE [DISTWIDTH] IN BLOOD: 14 % (ref 11.5–15.5)
GLUCOSE SERPL-MCNC: 95 MG/DL (ref 74–99)
HCT VFR BLD AUTO: 44.7 % (ref 34–46)
HDLC SERPL-MCNC: 59 MG/DL (ref 40–60)
HGB BLD-MCNC: 13.8 GM/DL (ref 11.4–16)
LDLC SERPL CALC-MCNC: 164 MG/DL (ref 0–99)
LYMPHOCYTES # SPEC AUTO: 1 K/UL (ref 1–4.8)
LYMPHOCYTES NFR SPEC AUTO: 19 %
MCH RBC QN AUTO: 30 PG (ref 25–35)
MCHC RBC AUTO-ENTMCNC: 30.8 G/DL (ref 31–37)
MCV RBC AUTO: 97.4 FL (ref 80–100)
MONOCYTES # BLD AUTO: 0.5 K/UL (ref 0–1)
MONOCYTES NFR BLD AUTO: 9 %
NEUTROPHILS # BLD AUTO: 3.3 K/UL (ref 1.3–7.7)
NEUTROPHILS NFR BLD AUTO: 66 %
PH UR: 6.5 [PH] (ref 5–8)
PLATELET # BLD AUTO: 216 K/UL (ref 150–450)
POTASSIUM SERPL-SCNC: 4.6 MMOL/L (ref 3.5–5.1)
RBC UR QL: <1 /HPF (ref 0–5)
SODIUM SERPL-SCNC: 141 MMOL/L (ref 137–145)
SP GR UR: 1 (ref 1–1.03)
SQUAMOUS UR QL AUTO: <1 /HPF (ref 0–4)
T4 FREE SERPL-MCNC: 1.1 NG/DL (ref 0.78–2.19)
TRIGL SERPL-MCNC: 80 MG/DL (ref ?–150)
UROBILINOGEN UR QL STRIP: <2 MG/DL (ref ?–2)
WBC # BLD AUTO: 5 K/UL (ref 3.8–10.6)
WBC #/AREA URNS HPF: 1 /HPF (ref 0–5)

## 2018-09-17 PROCEDURE — 80048 BASIC METABOLIC PNL TOTAL CA: CPT

## 2018-09-17 PROCEDURE — 80061 LIPID PANEL: CPT

## 2018-09-17 PROCEDURE — 84443 ASSAY THYROID STIM HORMONE: CPT

## 2018-09-17 PROCEDURE — 81001 URINALYSIS AUTO W/SCOPE: CPT

## 2018-09-17 PROCEDURE — 84460 ALANINE AMINO (ALT) (SGPT): CPT

## 2018-09-17 PROCEDURE — 85025 COMPLETE CBC W/AUTO DIFF WBC: CPT

## 2018-09-17 PROCEDURE — 84450 TRANSFERASE (AST) (SGOT): CPT

## 2018-09-17 PROCEDURE — 36415 COLL VENOUS BLD VENIPUNCTURE: CPT

## 2018-09-17 PROCEDURE — 84439 ASSAY OF FREE THYROXINE: CPT

## 2019-09-01 ENCOUNTER — HOSPITAL ENCOUNTER (EMERGENCY)
Dept: HOSPITAL 47 - EC | Age: 79
Discharge: HOME | End: 2019-09-01
Payer: MEDICARE

## 2019-09-01 VITALS — TEMPERATURE: 97.9 F | RESPIRATION RATE: 18 BRPM

## 2019-09-01 VITALS — SYSTOLIC BLOOD PRESSURE: 139 MMHG | HEART RATE: 94 BPM | DIASTOLIC BLOOD PRESSURE: 70 MMHG

## 2019-09-01 DIAGNOSIS — J30.2: ICD-10-CM

## 2019-09-01 DIAGNOSIS — Z87.891: ICD-10-CM

## 2019-09-01 DIAGNOSIS — N39.0: Primary | ICD-10-CM

## 2019-09-01 DIAGNOSIS — T36.1X5A: ICD-10-CM

## 2019-09-01 DIAGNOSIS — Z79.899: ICD-10-CM

## 2019-09-01 DIAGNOSIS — Z88.1: ICD-10-CM

## 2019-09-01 DIAGNOSIS — Z98.42: ICD-10-CM

## 2019-09-01 DIAGNOSIS — Z98.41: ICD-10-CM

## 2019-09-01 DIAGNOSIS — Z88.8: ICD-10-CM

## 2019-09-01 LAB
BILIRUB UR QL STRIP.AUTO: (no result)
HYALINE CASTS UR QL AUTO: 1 /LPF (ref 0–2)
PH UR: 6 [PH] (ref 5–8)
RBC UR QL: 17 /HPF (ref 0–5)
SP GR UR: 1.02 (ref 1–1.03)
SQUAMOUS UR QL AUTO: 1 /HPF (ref 0–4)
UROBILINOGEN UR QL STRIP: >12 MG/DL (ref ?–2)
WBC #/AREA URNS HPF: 14 /HPF (ref 0–5)

## 2019-09-01 PROCEDURE — 99283 EMERGENCY DEPT VISIT LOW MDM: CPT

## 2019-09-01 PROCEDURE — 81001 URINALYSIS AUTO W/SCOPE: CPT

## 2019-09-01 NOTE — ED
General Adult HPI





- General


Chief complaint: Allergic Reaction


Stated complaint: med reaction


Time Seen by Provider: 09/01/19 08:36


Source: patient, RN notes reviewed


Mode of arrival: ambulatory


Limitations: no limitations





- History of Present Illness


Initial comments: 





78-year-old female presents to the emergency department for a chief complaint of

med reaction.  States she needs a medication change.  Patient states that she 

was started on cefuroxime for urinary tract infection 5 days ago.  States she 

has never had this medication before.  States that she had itching began on her 

bilateral plantar feet one day after taking this and it has progressively 

worsened.  States this is worse at night.  Patient states she does not want any 

Benadryl and now she did not take her medication today because of this.  States 

she needs a new antibiotic.  States her suprapubic pressure is much better but 

she was supposed to taking medicine for 10 days.  States she had a culture done 

at her doctors and it was E. coli.Patient has no other complaints at this time 

including shortness of breath, chest pain, abdominal pain, nausea or vomiting, 

headache, or visual changes.





- Related Data


                                Home Medications











 Medication  Instructions  Recorded  Confirmed


 


Multivit with Calcium,Iron,Min 1 tab PO DAILY 09/01/16 02/19/18





[Women's Daily Multivitamin]   


 


Cholecalciferol [Vitamin D3 (25 1,000 unit PO DAILY 07/26/17 02/19/18





Mcg = 1000 Iu)]   


 


Metoprolol Tartrate [Lopressor] 25 mg PO BID 02/19/18 02/19/18


 


Polyethylene Glycol 3350 [Miralax] 17 gm PO DAILY 02/19/18 02/19/18


 


Vit C/E/Zn/Coppr/Lutein/Zeaxan 1 cap PO FR 02/19/18 02/19/18





[Preservision Areds 2 Softgel]   








                                  Previous Rx's











 Medication  Instructions  Recorded


 


Apixaban [Eliquis] 5 mg PO BID #60 tab 07/27/17


 


Pantoprazole Sodium [Protonix] 40 mg PO BID #60 tablet. 02/19/18


 


Sulfamethox-Tmp 800-160Mg [Bactrim 1 tab PO Q12HR #6 tab 02/19/18





-160 mg]  











                                    Allergies











Allergy/AdvReac Type Severity Reaction Status Date / Time


 


levofloxacin [From Levaquin] Allergy  Rash/Hives Verified 02/19/18 07:45


 


Additives in inhalers Allergy  Rapid Uncoded 02/19/18 07:45





   Heart Rate  














Review of Systems


ROS Statement: 


Those systems with pertinent positive or pertinent negative responses have been 

documented in the HPI.





ROS Other: All systems not noted in ROS Statement are negative.





Past Medical History


Past Medical History: Atrial Fibrillation, GERD/Reflux, Osteoarthritis (OA)


Additional Past Medical History / Comment(s): Seasonal allergies, Hiatal Hernia,

episode of tachycardia., neck pain in certain positions.boipsy on right foot and

under left breast


History of Any Multi-Drug Resistant Organisms: None Reported


Past Surgical History: Hysterectomy


Additional Past Surgical History / Comment(s): Colonoscopy,  cataract removed 

for both eyes


Past Anesthesia/Blood Transfusion Reactions: Family History of Problems w/ 

Anesthesia


Additional Past Anesthesia/Blood Transfusion Reaction / Comment(s): States the 

top of her head was numb after cataract surgery.   Pt. states her mother's heart

stopped during gallbladder surgery.


Past Psychological History: No Psychological Hx Reported


Smoking Status: Former smoker


Past Alcohol Use History: None Reported


Past Drug Use History: None Reported





- Past Family History


  ** Mother


Family Medical History: Coronary Artery Disease (CAD)





General Exam


Limitations: no limitations


General appearance: alert, in no apparent distress


Head exam: Present: atraumatic, normocephalic, normal inspection


Eye exam: Present: normal appearance, PERRL, EOMI.  Absent: scleral icterus, 

conjunctival injection, periorbital swelling


ENT exam: Present: normal exam, mucous membranes moist


Neck exam: Present: normal inspection, full ROM.  Absent: tenderness, 

meningismus, lymphadenopathy


Respiratory exam: Present: normal lung sounds bilaterally.  Absent: respiratory 

distress, wheezes, rales, rhonchi, stridor


Cardiovascular Exam: Present: regular rate, normal rhythm, normal heart sounds. 

Absent: systolic murmur, diastolic murmur, rubs, gallop, clicks


GI/Abdominal exam: Present: soft, normal bowel sounds.  Absent: distended, 

tenderness, guarding, rebound, rigid


Back exam: Absent: CVA tenderness (R), CVA tenderness (L)


Neurological exam: Present: alert


Psychiatric exam: Present: normal affect, normal mood


Skin exam: Present: warm, dry, intact, normal color.  Absent: rash





Course


                                   Vital Signs











  09/01/19





  08:28


 


Temperature 97.9 F


 


Pulse Rate 98


 


Respiratory 18





Rate 


 


Blood Pressure 144/79


 


O2 Sat by Pulse 98





Oximetry 














Medical Decision Making





- Medical Decision Making





78-year-old female presents for medication change.  Patient is having ALLERGIC 

symptoms to cefuroxime.  Patient has been on antibiotics for 5 days for urinary 

tract infection. On urinalysis patient has 14 white blood cells with occasional 

bacteria.  This will be cultured.  However patient will be put on Bactrim as she

is ALLERGIC to fluoroquinolones as well as possibly penicillins.  She will 

follow up with primary care.  She'll return should any worsening symptoms.





- Lab Data


                                   Lab Results











  09/01/19 Range/Units





  09:12 


 


Urine Color  Dark Brown  


 


Urine Appearance  Cloudy H  (Clear)  


 


Urine pH  6.0  (5.0-8.0)  


 


Ur Specific Gravity  1.022  (1.001-1.035)  


 


Urine Protein  Trace H  (Negative)  


 


Urine Glucose (UA)  Negative  (Negative)  


 


Urine Ketones  Negative  (Negative)  


 


Urine Blood  Small H  (Negative)  


 


Urine Nitrite  Negative  (Negative)  


 


Urine Bilirubin  2+ H  (Negative)  


 


Urine Urobilinogen  >12.0  (<2.0)  mg/dL


 


Ur Leukocyte Esterase  Trace H  (Negative)  


 


Urine RBC  17 H  (0-5)  /hpf


 


Urine WBC  14 H  (0-5)  /hpf


 


Ur Squamous Epith Cells  1  (0-4)  /hpf


 


Calcium Oxalate Crystal  Occasional H  (None)  /hpf


 


Amorphous Sediment  Rare H  (None)  /hpf


 


Urine Bacteria  Occasional H  (None)  /hpf


 


Hyaline Casts  1  (0-2)  /lpf


 


Urine Mucus  Occasional H  (None)  /hpf














Disposition


Clinical Impression: 


 Urinary tract infection





Disposition: HOME SELF-CARE


Condition: Good


Instructions (If sedation given, give patient instructions):  Urinary Tract 

Infection in Women (ED)


Additional Instructions: 


Please take in a bradycardic as directed.  Please follow-up with primary care in

1-2 days.  Follow up on culture results as well.  Returned to the ED if you have

any worsening symptoms.


Is patient prescribed a controlled substance at d/c from ED?: No


Referrals: 


Patric Montoya DO [Primary Care Provider] - 1-2 days


Time of Disposition: 09:46

## 2019-09-09 ENCOUNTER — HOSPITAL ENCOUNTER (INPATIENT)
Dept: HOSPITAL 47 - EC | Age: 79
LOS: 1 days | Discharge: HOME | DRG: 443 | End: 2019-09-10
Attending: HOSPITALIST | Admitting: HOSPITALIST
Payer: MEDICARE

## 2019-09-09 VITALS — RESPIRATION RATE: 20 BRPM

## 2019-09-09 DIAGNOSIS — L29.9: ICD-10-CM

## 2019-09-09 DIAGNOSIS — M19.91: ICD-10-CM

## 2019-09-09 DIAGNOSIS — K62.3: ICD-10-CM

## 2019-09-09 DIAGNOSIS — Z90.710: ICD-10-CM

## 2019-09-09 DIAGNOSIS — Z79.01: ICD-10-CM

## 2019-09-09 DIAGNOSIS — I48.91: ICD-10-CM

## 2019-09-09 DIAGNOSIS — M41.9: ICD-10-CM

## 2019-09-09 DIAGNOSIS — T36.8X5A: ICD-10-CM

## 2019-09-09 DIAGNOSIS — Z91.048: ICD-10-CM

## 2019-09-09 DIAGNOSIS — F41.0: ICD-10-CM

## 2019-09-09 DIAGNOSIS — Z96.1: ICD-10-CM

## 2019-09-09 DIAGNOSIS — G43.109: ICD-10-CM

## 2019-09-09 DIAGNOSIS — K71.0: Primary | ICD-10-CM

## 2019-09-09 DIAGNOSIS — Z82.49: ICD-10-CM

## 2019-09-09 DIAGNOSIS — Z98.42: ICD-10-CM

## 2019-09-09 DIAGNOSIS — K21.9: ICD-10-CM

## 2019-09-09 DIAGNOSIS — Z87.891: ICD-10-CM

## 2019-09-09 DIAGNOSIS — Z79.899: ICD-10-CM

## 2019-09-09 DIAGNOSIS — Z98.41: ICD-10-CM

## 2019-09-09 DIAGNOSIS — Z88.1: ICD-10-CM

## 2019-09-09 LAB
ALBUMIN SERPL-MCNC: 3.9 G/DL (ref 3.5–5)
ALP SERPL-CCNC: 475 U/L (ref 38–126)
ALT SERPL-CCNC: 413 U/L (ref 9–52)
AMYLASE SERPL-CCNC: 74 U/L (ref 30–110)
ANION GAP SERPL CALC-SCNC: 9 MMOL/L
APTT BLD: 25.1 SEC (ref 22–30)
AST SERPL-CCNC: 243 U/L (ref 14–36)
BILIRUB UR QL STRIP.AUTO: (no result)
BUN SERPL-SCNC: 14 MG/DL (ref 7–17)
CALCIUM SPEC-MCNC: 8.9 MG/DL (ref 8.4–10.2)
CELLS COUNTED: 100
CHLORIDE SERPL-SCNC: 106 MMOL/L (ref 98–107)
CO2 SERPL-SCNC: 24 MMOL/L (ref 22–30)
EOSINOPHIL # BLD MANUAL: 0.24 K/UL (ref 0–0.7)
ERYTHROCYTE [DISTWIDTH] IN BLOOD BY AUTOMATED COUNT: 4.35 M/UL (ref 3.8–5.4)
ERYTHROCYTE [DISTWIDTH] IN BLOOD: 17.1 % (ref 11.5–15.5)
GLUCOSE SERPL-MCNC: 123 MG/DL (ref 74–99)
HCT VFR BLD AUTO: 41.8 % (ref 34–46)
HGB BLD-MCNC: 13.6 GM/DL (ref 11.4–16)
INR PPP: 0.9 (ref ?–1.2)
LYMPHOCYTES # BLD MANUAL: 1.2 K/UL (ref 1–4.8)
MCH RBC QN AUTO: 31.3 PG (ref 25–35)
MCHC RBC AUTO-ENTMCNC: 32.5 G/DL (ref 31–37)
MCV RBC AUTO: 96.2 FL (ref 80–100)
MONOCYTES # BLD MANUAL: 1.02 K/UL (ref 0–1)
NEUTROPHILS NFR BLD MANUAL: 59 %
PH UR: 5.5 [PH] (ref 5–8)
PLATELET # BLD AUTO: 242 K/UL (ref 150–450)
POTASSIUM SERPL-SCNC: 4.6 MMOL/L (ref 3.5–5.1)
PROT SERPL-MCNC: 7.1 G/DL (ref 6.3–8.2)
PT BLD: 10.1 SEC (ref 9–12)
SODIUM SERPL-SCNC: 139 MMOL/L (ref 137–145)
SP GR UR: 1.03 (ref 1–1.03)
UROBILINOGEN UR QL STRIP: 3 MG/DL (ref ?–2)
WBC # BLD AUTO: 6 K/UL (ref 3.8–10.6)

## 2019-09-09 PROCEDURE — 81003 URINALYSIS AUTO W/O SCOPE: CPT

## 2019-09-09 PROCEDURE — 74018 RADEX ABDOMEN 1 VIEW: CPT

## 2019-09-09 PROCEDURE — 83615 LACTATE (LD) (LDH) ENZYME: CPT

## 2019-09-09 PROCEDURE — 80074 ACUTE HEPATITIS PANEL: CPT

## 2019-09-09 PROCEDURE — 85730 THROMBOPLASTIN TIME PARTIAL: CPT

## 2019-09-09 PROCEDURE — 82103 ALPHA-1-ANTITRYPSIN TOTAL: CPT

## 2019-09-09 PROCEDURE — 82390 ASSAY OF CERULOPLASMIN: CPT

## 2019-09-09 PROCEDURE — 85025 COMPLETE CBC W/AUTO DIFF WBC: CPT

## 2019-09-09 PROCEDURE — 36415 COLL VENOUS BLD VENIPUNCTURE: CPT

## 2019-09-09 PROCEDURE — 86038 ANTINUCLEAR ANTIBODIES: CPT

## 2019-09-09 PROCEDURE — 83516 IMMUNOASSAY NONANTIBODY: CPT

## 2019-09-09 PROCEDURE — 86376 MICROSOMAL ANTIBODY EACH: CPT

## 2019-09-09 PROCEDURE — 74170 CT ABD WO CNTRST FLWD CNTRST: CPT

## 2019-09-09 PROCEDURE — 82150 ASSAY OF AMYLASE: CPT

## 2019-09-09 PROCEDURE — 99285 EMERGENCY DEPT VISIT HI MDM: CPT

## 2019-09-09 PROCEDURE — 82728 ASSAY OF FERRITIN: CPT

## 2019-09-09 PROCEDURE — 84165 PROTEIN E-PHORESIS SERUM: CPT

## 2019-09-09 PROCEDURE — 83540 ASSAY OF IRON: CPT

## 2019-09-09 PROCEDURE — 83550 IRON BINDING TEST: CPT

## 2019-09-09 PROCEDURE — 76705 ECHO EXAM OF ABDOMEN: CPT

## 2019-09-09 PROCEDURE — 85610 PROTHROMBIN TIME: CPT

## 2019-09-09 PROCEDURE — 83690 ASSAY OF LIPASE: CPT

## 2019-09-09 PROCEDURE — 96360 HYDRATION IV INFUSION INIT: CPT

## 2019-09-09 PROCEDURE — 80053 COMPREHEN METABOLIC PANEL: CPT

## 2019-09-09 RX ADMIN — CEFAZOLIN SCH MLS/HR: 330 INJECTION, POWDER, FOR SOLUTION INTRAMUSCULAR; INTRAVENOUS at 14:32

## 2019-09-09 RX ADMIN — APIXABAN SCH MG: 5 TABLET, FILM COATED ORAL at 19:39

## 2019-09-09 RX ADMIN — METOPROLOL TARTRATE SCH MG: 25 TABLET, FILM COATED ORAL at 19:39

## 2019-09-09 RX ADMIN — CEFAZOLIN SCH: 330 INJECTION, POWDER, FOR SOLUTION INTRAMUSCULAR; INTRAVENOUS at 19:38

## 2019-09-09 NOTE — CT
EXAMINATION TYPE: CT abdomen wo/w con

 

DATE OF EXAM: 9/9/2019

 

HISTORY: Elevated liver enzymes, intrahepatic dilation, jaundice

 

CT DLP: 490.90mGycm  Automated Exposure Control for Dose Reduction was Utilized.

 

CONTRAST: 

CT scan of the abdomen is performed without oral but without and with IV Contrast, patient injected w
ith 100 mL of Isovue 300.

 

COMPARISON: Gallbladder ultrasound earlier today. CT abdomen and pelvis October 20, 2017

 

FINDINGS:

 

LUNG BASES: No significant abnormality is appreciated.

 

LIVER/GB: Small dependent calcified gallstones. No surrounding fat stranding or wall thickening. Conf
irmation of new moderate to severe central intrahepatic biliary dilatation more prominent in the left
 hepatic lobe new from 2017 CT. No obvious central intrahepatic mass identified. No extrahepatic bili
elis dilatation is seen. Patent central portal vein. Patent hepatic drains draining into IVC Abrupt cu
t off internal intrahepatic ducts axial image 17 series 9 is strongly suspicious for central neoplasm
 though this area is vague and slightly hypodense relative to adjacent liver. Central cholangiocarcin
sarah is suspected. Consider ERCP follow-up to further evaluate.

 

PANCREAS: Prominent pancreatic head without mass or ductal dilatation.

 

SPLEEN: No significant abnormality is seen.

 

ADRENALS: No significant abnormality is seen.

 

KIDNEYS: Subcentimeter simple appearing cyst laterally right kidney lower pole level delayed image 40
.

 

BOWEL: Stable small to moderate size hiatal hernia. Oral contrast does not reach colonic level. No virk
spicious bowel dilatation is seen.

 

LYMPH NODES: No greater than 1cm abdominal lymph nodes are appreciated.

 

OSSEOUS STRUCTURES: Underlying levoconvex scoliosis centered at L2 level.

 

OTHER: Mild to moderate calcified plaque of the aorta.

 

IMPRESSION: Confirmation of new moderate intrahepatic biliary dilatation with abrupt biliary cut off.
 Central liver mass not well-seen but strongly suspected. 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
. Consider ERCP to further evaluate and possibly sample.

## 2019-09-09 NOTE — P.CONS
History of Present Illness





- Reason for Consult


Consult date: 19


Jaundice


Requesting physician: Wyatt Melo





- Chief Complaint


Jaundice pruritus





- History of Present Illness





78-year-old female with medical history significant for atrial fibrillation on 

anticoagulation therapy, osteoarthritis, GERD and rectal prolapse presented to 

the hospital with a constellation of symptoms.  Initially the patient had been 

seen for dark urine.  She reports antibiotic treatment initially with Augmentin 

which was subsequently switched to Bactrim.  She reports dark urine occurring 

approximately one week ago.  She also reports pruritus occurring 4 days ago and 

jaundice which she noticed 2 days prior to presentation.  Over the past 2 weeks 

she's had decreased oral intake and a general feeling of being unwell.  She 

denies any history of liver disease or heavy alcohol use.  She has a 

questionable exposure to hepatitis B in the past through work.  However she 

denies any prior blood transfusions, high risk sexual behavior or other risk 

factors.





Review of Systems





REVIEW OF SYSTEMS:


CONSTITUTIONAL: Denies any fevers, chills, weight change or fatigue.


CARDIOVASCULAR: Denies any chest pain, palpitations high or low blood pressures,

but history of atrial fibrillation.


RESPIRATORY: Denies any shortness of breath, hemoptysis or cough.  


GENITOURINARY:  No dysuria or hematuria, but dark urine.. 


MUSCULOSKELETAL: No weakness reported. 


SKIN: Denies any new rashes or lesions, but reports jaundice and pruritus. 


PSYCHIATRIC: Denies any depression or anxiety. 


NEUROLOGY: Denies headache, denies any new focal deficits. 


EARS/NOSE/THROAT: No recent hearing change, congestion, nasal discharge or sore 

throat.


EYES: No pain in eyes, discharge or change in vision. 


GASTROINTESTINAL: As per HPI.





Past Medical History


Past Medical History: Atrial Fibrillation, GERD/Reflux, Osteoarthritis (OA)


Additional Past Medical History / Comment(s): Possible hepatitis B years ago, 

recent UTI with antibiotic, past UTIs, paroxysmal afib, cardiac murmur, 

scoliosis, past occasional low back pain, DJD, cervical pain d/t position, 

ocular migraines, occasional numbness/tingling to toes bilateral feet, hiatal 

hernia, rectal prolapse.


History of Any Multi-Drug Resistant Organisms: None Reported


Past Surgical History: Appendectomy, Breast Surgery, Hernia Repair, Hysterectomy


Additional Past Surgical History / Comment(s): Umbilical hernia repair, EGD, 

colonoscopy, bilateral cataract removals/lens implants, L breast benign biopsy


Past Anesthesia/Blood Transfusion Reactions: Family History of Problems w/ 

Anesthesia, Postoperative Nausea & Vomiting (PONV)


Additional Past Anesthesia/Blood Transfusion Reaction / Comm: Pt states her 

mother's heart stopped during gallbladder surgery.


Smoking Status: Never smoker





- Past Family History


  ** Mother


Family Medical History: Coronary Artery Disease (CAD), CVA/TIA


Additional Family Medical History / Comment(s): Mother had cardiac valve 

disease.  She  from a postop CVA.





  ** Father


Additional Family Medical History / Comment(s): Father had myasthenia gravis.





Medications and Allergies


                                Home Medications











 Medication  Instructions  Recorded  Confirmed  Type


 


Multivit with Calcium,Iron,Min 1 tab PO DAILY 16 History





[Women's Daily Multivitamin]    


 


Cholecalciferol [Vitamin D3 (25 3,000 unit PO BID 17 History





Mcg = 1000 Iu)]    


 


Apixaban [Eliquis] 5 mg PO BID #60 tab 17 Rx


 


Metoprolol Tartrate [Lopressor] 25 mg PO BID 18 History


 


Sulfamethox-Tmp 800-160Mg [Bactrim 1 tab PO Q12HR #14 tab 19 Rx





-160 mg]    


 


Ondansetron [Zofran] 4 mg PO Q8H PRN 19 History


 


Pantoprazole Sodium [Protonix] 40 mg PO DAILY 19 History








                                    Allergies











Allergy/AdvReac Type Severity Reaction Status Date / Time


 


cefuroxime Allergy  Itching Verified 19 10:14


 


levofloxacin [From Levaquin] Allergy  Rash/Hives Verified 19 10:14


 


Additives in inhalers Allergy  Rapid Uncoded 19 09:38





   Heart Rate  














Physical Exam


Vitals: 


                                   Vital Signs











  Temp Pulse Pulse Resp BP BP Pulse Ox


 


 19 20:45  98.4 F   71  20   97/59  96


 


 19 16:00  98 F  79   16  108/68   97


 


 19 09:36  98.4 F  78   18  134/82   99








                                Intake and Output











 19





 06:59 14:59 22:59


 


Other:   


 


  Voiding Method   Toilet


 


  Weight  62.142 kg 














On physical examination, patient appears comfortable in no apparent distress. 


HEAD: Normocephalic, atraumatic. 


EYES: Scleral icterus. No conjunctival injection. 


MOUTH: No lesions, tongue midline. 


NECK: Trachea midline, no gross abnormalities. 


CHEST: Clear to auscultation with no wheezing or rhonchi appreciated. 


HEART: Irregularly irregular. 


ABDOMEN: Soft, obese. Bowel sounds are positive. No organomegaly.  No guarding 

or rigidity.


EXTREMITIES: No pedal edema. 


SKIN: No rashes, jaundice. 


NEUROLOGIC: Alert and oriented x3.  No focal deficits. 





Results


CBC & Chem 7: 


                                 19 09:54





                                 19 09:54


Labs: 


                  Abnormal Lab Results - Last 24 Hours (Table)











  19 Range/Units





  09:54 09:54 10:00 


 


RDW   17.1 H   (11.5-15.5)  %


 


Monocytes # (Manual)   1.02 H   (0-1.0)  k/uL


 


Glucose  123 H    (74-99)  mg/dL


 


Total Bilirubin  13.6 H    (0.2-1.3)  mg/dL


 


AST  243 H    (14-36)  U/L


 


ALT  413 H    (9-52)  U/L


 


Alkaline Phosphatase  475 H    ()  U/L


 


Urine Protein    Trace H  (Negative)  


 


Urine Bilirubin    4+ H  (Negative)  











US - abdomen: report reviewed (Ultrasound of the abdomen with mild-to-moderate 

intrahepatic ductal dilation and no other acute finding)





Assessment and Plan


(1) Elevated liver enzymes


Narrative/Plan: 


78-year-old female who presented with a constellation of symptoms including 

jaundice, dark urine, pruritus and decreased oral intake.  Patient had elevation

in her liver enzymes and predominantly a cholestatic pattern with total 

bilirubin 13.6, alkaline phosphatase 175,  and MALT 413.  Patient does 

report recent antibiotic treatment initially with Augmentin which was 

subsequently switched to Bactrim.  Ultrasound of the abdomen was significant for

some findings of intrahepatic biliary dilation.  At this time suspicion is for 

drug-induced liver injury, however.  Neurology order to rule out underlying 

intrinsic liver disease, as well as computed tomography scan of the abdomen 

ordered to rule out obstructed pattern of jaundice.  Patient has refused MRI as 

she is unable to tolerate the imaging study due to anxiety/panic attacks.


Current Visit: Yes   Status: Acute   Code(s): R74.8 - ABNORMAL LEVELS OF OTHER 

SERUM ENZYMES   SNOMED Code(s): 209092931


   





(2) Pruritic disorder


Current Visit: Yes   Status: Acute   Code(s): L29.9 - PRURITUS, UNSPECIFIED   

SNOMED Code(s): 367056944


   





(3) Jaundice of recent onset


Current Visit: Yes   Status: Acute   Code(s): R17 - UNSPECIFIED JAUNDICE   

SNOMED Code(s): 11964120


   





(4) Atrial fibrillation with RVR


Current Visit: No   Status: Acute   Code(s): I48.91 - UNSPECIFIED ATRIAL 

FIBRILLATION   SNOMED Code(s): 473882322069447


   


Plan: 





Supportive care


Okay for diet


Continue to monitor CBC, CMP, INR


Full liver serologies ordered


Plan was for MRI/MRCP however patient refuses due to underlying anxiety, panic 

attacks


Computed tomography scan of the abdomen ordered


Atarax ordered for pruritus, if patient continues to have symptoms can add a 

bile salt binder such as cholestyramine


Thank you for allowing us to participate in the care of the patient we will 

continue to follow

## 2019-09-09 NOTE — US
EXAMINATION TYPE: US gallbladder

 

DATE OF EXAM: 9/9/2019

 

COMPARISON: CT October 20, 2017

 

CLINICAL HISTORY: Pain. abd pain x 2 weeks, jaundice, elevated bilirubin 

 

EXAM MEASUREMENTS:

 

Liver Length:  14.0 cm   

Gallbladder Wall:  0.2 cm   

CBD:  0.5 cm

Right Kidney:  7.7 x 4.5 x 4.6 cm

 

 

 

Pancreas:  portion seen wnl

Liver:  heterogeneous, intercostal images only due to bowel gas, intrahepatic duct dilatation seen   


Gallbladder:  rolled LLD and that is when mobile debris was seen within neck of GB and stone seen

**Evidence for sonographic Light's sign:  yes

CBD:  wnl 

Right Kidney:  small in size, difficult to optimize due to gas and rib shadow 

 

Visualized portion of pancreas show no worrisome mass or ductal dilatation. Visualized liver is heter
ogeneous with mild to moderate intrahepatic ductal dilatation. No extrahepatic ductal dilatation. Mary
pect small stones and/or gallbladder sludge. No shadowing mobile gallstones.

 

IMPRESSION: Suspect new mild to moderate central intrahepatic ductal dilatation without extrahepatic 
dilatation. Developing central liver mass needs to be considered. Follow-up contrast enhanced liver p
rotocol CT or MRI is advised.

## 2019-09-09 NOTE — P.HPIM
History of Present Illness


H&P Date: 19


Chief Complaint: Jaundiced


History of presenting complaint:


This is a very pleasant 78-year-old patient of Dr. Montoya.  Chronic stable 

medical conditions include atrial fibrillation, osteoarthritis, GERD, scoliosis 

and rectal prolapse.  About a week ago patient noticed urine to be dark and 

decided to go and see if family doctor.  Was prescribed antibiotics and started 

on Bactrim.  About 4 days ago she noticed that she was getting more itching and 

noticed in the mirror about 2 days ago that she's becoming more yellow.  She 

also developed nausea.  Appetite hasn't been good for last week.  Also lost some

weight in the last 2 weeks.  Patient complains of chronic upper abdominal pain 

for many years on a life.  She thinks she may have had some hepatitis exposure 

in the 90s.  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  But there is no need to contact no

blood transfusion and no sexual activity to explain the same.  Patient's 

daughter-in-law is by the bedside.





Review of systems:


GEN.:  Tired, decreased appetite


EYES: None


HEENT: None


NECK: None


RESPIRATORY: None


CARDIOVASCULAR: None


GASTROINTESTINAL: Nausea with chronic upper abdominal pain


GENITOURINARY: Dark urine


MUSCULOSKELETAL: Pain in joints


LYMPHATICS: None


HEMATOLOGICAL: None  


PSYCHIATRY: None


NEUROLOGICAL: None








Past medical history:


Atrial fibrillation, GERD, osteoarthritis, scoliosis, DJD, ocular migraines, 

hiatal hernia, rectal prolapse





Social history:


Lives alone.  No smoking or alcohol.  Was a paraprofessional looking at the 

school.





Family history:


Coronary artery disease.





Physical examination:


VITAL SIGNS: 98.4, 78, 18, 134/82, 99% room air


GENERAL: Average built, laying in bed, but tired.


EYES: Pupils equal.  Conjunctiva icterusl.


HEENT: External appearance of nose and ears normal, oral cavity grossly normal.


NECK: JVD not raised; masses not palpable.


HEART: First and second heart sounds are normal;  no edema.  


LUNGS: Respiratory rate normal; clear to auscultation.  


ABDOMEN: Soft, right upper quadrant tenderness, no guarding or rigidity, liver 

spleen not palpable, no masses palpable.  


PSYCH: Alert and oriented x3;  mood  and affect normal.  


NEUROLOGICAL: Cranial nerves grossly intact; no facial asymmetry,   power and 

sensation grossly intact. 


LYMPHATICS: No lymph nodes palpable in the axilla and neck


MUSCULOSKELETAL: Evidence of osteoarthritis especially in the hands





INVESTIGATIONS, reviewed in the clinical context:


White count 6 hemoglobin 13.6 platelets 242 potassium 4.6 bun 14 creatinine 0.74


Bilirubin 3.6   alkaline phosphatase 476


Ultrasound-showing intrahepatic stasis








Assessment:


-This is a patient of symptoms are rather short-lived for about 7 days.  Patient

was receiving Bactrim for UTI.  And she started having nausea and became 

jaundiced.  Patient has cholestatic picture.  Most likely this is Bactrim-

induced hepatitis.  Need to rule out other causes.


-History of atrial fibrillation


-Primary osteoarthritis


-GERD


-Chronic scoliosis


-Chronic rectal prolapse





Plan:


Acute hepatitis panel was ordered.  Lovenox for DVT prophylaxis.  Repeat labs in

the morning.  Expect the stopping of Bactrim LFTs to start coming down.  We'll 

get a GI opinion.  Care was discussed with the patient.  Questions were 

answered.





Past Medical History


Past Medical History: Atrial Fibrillation, GERD/Reflux, Osteoarthritis (OA)


Additional Past Medical History / Comment(s): Possible hepatitis B years ago, 

recent UTI with antibiotic, past UTIs, paroxysmal afib, cardiac murmur, 

scoliosis, past occasional low back pain, DJD, cervical pain d/t position, 

ocular migraines, occasional numbness/tingling to toes bilateral feet, hiatal 

hernia, rectal prolapse.


History of Any Multi-Drug Resistant Organisms: None Reported


Past Surgical History: Appendectomy, Breast Surgery, Hernia Repair, Hysterectomy


Additional Past Surgical History / Comment(s): Umbilical hernia repair, EGD, 

colonoscopy, bilateral cataract removals/lens implants, L breast benign biopsy


Past Anesthesia/Blood Transfusion Reactions: Family History of Problems w/ 

Anesthesia, Postoperative Nausea & Vomiting (PONV)


Additional Past Anesthesia/Blood Transfusion Reaction / Comment(s): Pt states 

her mother's heart stopped during gallbladder surgery.


Smoking Status: Never smoker





- Past Family History


  ** Mother


Family Medical History: Coronary Artery Disease (CAD), CVA/TIA


Additional Family Medical History / Comment(s): Mother had cardiac valve 

disease.  She  from a postop CVA.





  ** Father


Additional Family Medical History / Comment(s): Father had myasthenia gravis.





Medications and Allergies


                                Home Medications











 Medication  Instructions  Recorded  Confirmed  Type


 


Multivit with Calcium,Iron,Min 1 tab PO DAILY 16 History





[Women's Daily Multivitamin]    


 


Cholecalciferol [Vitamin D3 (25 3,000 unit PO BID 17 History





Mcg = 1000 Iu)]    


 


Apixaban [Eliquis] 5 mg PO BID #60 tab 17 Rx


 


Metoprolol Tartrate [Lopressor] 25 mg PO BID 18 History


 


Sulfamethox-Tmp 800-160Mg [Bactrim 1 tab PO Q12HR #14 tab 19 Rx





-160 mg]    


 


Ondansetron [Zofran] 4 mg PO Q8H PRN 19 History


 


Pantoprazole Sodium [Protonix] 40 mg PO DAILY 19 History








                                    Allergies











Allergy/AdvReac Type Severity Reaction Status Date / Time


 


cefuroxime Allergy  Itching Verified 19 10:14


 


levofloxacin [From Levaquin] Allergy  Rash/Hives Verified 19 10:14


 


Additives in inhalers Allergy  Rapid Uncoded 19 09:38





   Heart Rate  














Physical Exam


Vitals: 


                                   Vital Signs











  Temp Pulse Resp BP Pulse Ox


 


 19 16:00  98 F  79  16  108/68  97


 


 19 09:36  98.4 F  78  18  134/82  99








                                Intake and Output











 19





 06:59 14:59 22:59


 


Other:   


 


  Voiding Method   Toilet


 


  Weight  62.142 kg 














Results


CBC & Chem 7: 


                                 19 09:54





                                 19 09:54


Labs: 


                  Abnormal Lab Results - Last 24 Hours (Table)











  19 Range/Units





  09:54 09:54 10:00 


 


RDW   17.1 H   (11.5-15.5)  %


 


Monocytes # (Manual)   1.02 H   (0-1.0)  k/uL


 


Glucose  123 H    (74-99)  mg/dL


 


Total Bilirubin  13.6 H    (0.2-1.3)  mg/dL


 


AST  243 H    (14-36)  U/L


 


ALT  413 H    (9-52)  U/L


 


Alkaline Phosphatase  475 H    ()  U/L


 


Urine Protein    Trace H  (Negative)  


 


Urine Bilirubin    4+ H  (Negative)  














Thrombosis Risk Factor Assmnt





- Choose All That Apply


Any of the Below Risk Factors Present?: Yes


Other Risk Factors: Yes


Each Risk Factor Represents 3 Points: Age 75 years or older


Other congenital or acquired thrombophilia - If yes, enter type in comment: No


Thrombosis Risk Factor Assessment Total Risk Factor Score: 3


Thrombosis Risk Factor Assessment Level: Moderate Risk

## 2019-09-09 NOTE — ED
General Adult HPI





- General


Chief complaint: Recheck/Abnormal Lab/Rx


Stated complaint: Jaundice


Time Seen by Provider: 09/09/19 09:36


Source: patient, RN notes reviewed


Mode of arrival: ambulatory


Limitations: no limitations





- History of Present Illness


Initial comments: 





Patient is a pleasant 78-year-old female presenting to the emergency Department 

with yellowish discoloration of her skin.  Onset of symptoms was yesterday.  No 

history of similar symptoms previously.  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 

not had any problems with that.  Patient was in the emergency department a week 

or so ago with urinary problems and was diagnosed with urinary tract infection 

and placed on antibiotics.  Patient did follow-up with her doctor with this 

secondary to itching.  Patient denies any new abdominal discomfort.  Patient 

states her stools are somewhat white discolored.





- Related Data


                                Home Medications











 Medication  Instructions  Recorded  Confirmed


 


Multivit with Calcium,Iron,Min 1 tab PO DAILY 09/01/16 09/09/19





[Women's Daily Multivitamin]   


 


Cholecalciferol [Vitamin D3 (25 3,000 unit PO BID 07/26/17 09/09/19





Mcg = 1000 Iu)]   


 


Metoprolol Tartrate [Lopressor] 25 mg PO BID 02/19/18 09/09/19


 


Ondansetron [Zofran] 4 mg PO Q8H PRN 09/09/19 09/09/19


 


Pantoprazole Sodium [Protonix] 40 mg PO DAILY 09/09/19 09/09/19








                                  Previous Rx's











 Medication  Instructions  Recorded


 


Apixaban [Eliquis] 5 mg PO BID #60 tab 07/27/17


 


Sulfamethox-Tmp 800-160Mg [Bactrim 1 tab PO Q12HR #14 tab 09/01/19





-160 mg]  











                                    Allergies











Allergy/AdvReac Type Severity Reaction Status Date / Time


 


cefuroxime Allergy  Itching Verified 09/09/19 10:14


 


levofloxacin [From Levaquin] Allergy  Rash/Hives Verified 09/09/19 10:14


 


Additives in inhalers Allergy  Rapid Uncoded 09/09/19 09:38





   Heart Rate  














Review of Systems


ROS Statement: 


Those systems with pertinent positive or pertinent negative responses have been 

documented in the HPI.





ROS Other: All systems not noted in ROS Statement are negative.


Constitutional: Denies: fever


Eyes: Denies: eye pain


ENT: Denies: ear pain


Respiratory: Denies: cough


Cardiovascular: Denies: chest pain


Endocrine: Denies: fatigue


Gastrointestinal: Denies: abdominal pain, nausea, vomiting


Genitourinary: Denies: dysuria


Musculoskeletal: Denies: back pain


Skin: Reports: as per HPI


Neurological: Denies: weakness





Past Medical History


Past Medical History: Atrial Fibrillation, GERD/Reflux, Osteoarthritis (OA)


Additional Past Medical History / Comment(s): Seasonal allergies, Hiatal Hernia,

tachycardia, positions.boipsy on right foot and under left breast


History of Any Multi-Drug Resistant Organisms: None Reported


Past Surgical History: Hysterectomy


Additional Past Surgical History / Comment(s): Colonoscopy,  cataract removed 

for both eyes


Past Anesthesia/Blood Transfusion Reactions: Family History of Problems w/ 

Anesthesia


Additional Past Anesthesia/Blood Transfusion Reaction / Comment(s): States the 

top of her head was numb after cataract surgery.   Pt. states her mother's heart

stopped during gallbladder surgery.


Past Psychological History: No Psychological Hx Reported


Smoking Status: Former smoker


Past Alcohol Use History: None Reported


Past Drug Use History: None Reported





- Past Family History


  ** Mother


Family Medical History: Coronary Artery Disease (CAD)





General Exam


Limitations: no limitations


General appearance: alert, in no apparent distress


Head exam: Present: atraumatic


Eye exam: Present: normal appearance, PERRL, scleral icterus


ENT exam: Present: normal oropharynx


Neck exam: Present: normal inspection


Respiratory exam: Present: normal lung sounds bilaterally


Cardiovascular Exam: Present: regular rate, normal rhythm


GI/Abdominal exam: Present: soft, tenderness (Minimal tenderness near the 

periumbilical region which patient states is chronic from her hernia repair.).  

Absent: distended, organomegaly


Extremities exam: Present: normal inspection


Neurological exam: Present: alert


Psychiatric exam: Present: normal affect, normal mood


Skin exam: Present: other (Jaundice appearance to the skin)





Course


                                   Vital Signs











  09/09/19





  09:36


 


Temperature 98.4 F


 


Pulse Rate 78


 


Respiratory 18





Rate 


 


Blood Pressure 134/82


 


O2 Sat by Pulse 99





Oximetry 














Medical Decision Making





- Medical Decision Making





Patient reevaluated and resting comfortably in bed.  Patient updated on results 

and plan.  Case was discussed in detail with Dr. Melo, who will admit 

covering for Dr. Montoya.  GI will be placed on consult.  Ultrasound and 

hepatitis panel are pending.





- Lab Data


Result diagrams: 


                                 09/09/19 09:54





                                 09/09/19 09:54


                                   Lab Results











  09/09/19 09/09/19 09/09/19 Range/Units





  09:54 09:54 09:54 


 


WBC   6.0   (3.8-10.6)  k/uL


 


RBC   4.35   (3.80-5.40)  m/uL


 


Hgb   13.6   (11.4-16.0)  gm/dL


 


Hct   41.8   (34.0-46.0)  %


 


MCV   96.2   (80.0-100.0)  fL


 


MCH   31.3   (25.0-35.0)  pg


 


MCHC   32.5   (31.0-37.0)  g/dL


 


RDW   17.1 H   (11.5-15.5)  %


 


Plt Count   242   (150-450)  k/uL


 


Neutrophils % (Manual)   59   %


 


Lymphocytes % (Manual)   20   %


 


Monocytes % (Manual)   17   %


 


Eosinophils % (Manual)   4   %


 


Neutrophils # (Manual)   3.54   (1.3-7.7)  k/uL


 


Lymphocytes # (Manual)   1.20   (1.0-4.8)  k/uL


 


Monocytes # (Manual)   1.02 H   (0-1.0)  k/uL


 


Eosinophils # (Manual)   0.24   (0-0.7)  k/uL


 


Nucleated RBCs   0   (0-0)  /100 WBC


 


Manual Slide Review   Performed   


 


Anisocytosis   Slight   


 


Macrocytosis   Slight   


 


Target Cells   Present   


 


PT    10.1  (9.0-12.0)  sec


 


INR    0.9  (<1.2)  


 


APTT    25.1  (22.0-30.0)  sec


 


Sodium  139    (137-145)  mmol/L


 


Potassium  4.6    (3.5-5.1)  mmol/L


 


Chloride  106    ()  mmol/L


 


Carbon Dioxide  24    (22-30)  mmol/L


 


Anion Gap  9    mmol/L


 


BUN  14    (7-17)  mg/dL


 


Creatinine  0.74    (0.52-1.04)  mg/dL


 


Est GFR (CKD-EPI)AfAm  90    (>60 ml/min/1.73 sqM)  


 


Est GFR (CKD-EPI)NonAf  78    (>60 ml/min/1.73 sqM)  


 


Glucose  123 H    (74-99)  mg/dL


 


Calcium  8.9    (8.4-10.2)  mg/dL


 


Total Bilirubin  13.6 H    (0.2-1.3)  mg/dL


 


AST  243 H    (14-36)  U/L


 


ALT  413 H    (9-52)  U/L


 


Alkaline Phosphatase  475 H    ()  U/L


 


Total Protein  7.1    (6.3-8.2)  g/dL


 


Albumin  3.9    (3.5-5.0)  g/dL


 


Amylase  74    ()  U/L


 


Lipase  193    ()  U/L


 


Urine Color     


 


Urine Appearance     (Clear)  


 


Urine pH     (5.0-8.0)  


 


Ur Specific Gravity     (1.001-1.035)  


 


Urine Protein     (Negative)  


 


Urine Glucose (UA)     (Negative)  


 


Urine Ketones     (Negative)  


 


Urine Blood     (Negative)  


 


Urine Nitrite     (Negative)  


 


Urine Bilirubin     (Negative)  


 


Urine Urobilinogen     (<2.0)  mg/dL


 


Ur Leukocyte Esterase     (Negative)  














  09/09/19 Range/Units





  10:00 


 


WBC   (3.8-10.6)  k/uL


 


RBC   (3.80-5.40)  m/uL


 


Hgb   (11.4-16.0)  gm/dL


 


Hct   (34.0-46.0)  %


 


MCV   (80.0-100.0)  fL


 


MCH   (25.0-35.0)  pg


 


MCHC   (31.0-37.0)  g/dL


 


RDW   (11.5-15.5)  %


 


Plt Count   (150-450)  k/uL


 


Neutrophils % (Manual)   %


 


Lymphocytes % (Manual)   %


 


Monocytes % (Manual)   %


 


Eosinophils % (Manual)   %


 


Neutrophils # (Manual)   (1.3-7.7)  k/uL


 


Lymphocytes # (Manual)   (1.0-4.8)  k/uL


 


Monocytes # (Manual)   (0-1.0)  k/uL


 


Eosinophils # (Manual)   (0-0.7)  k/uL


 


Nucleated RBCs   (0-0)  /100 WBC


 


Manual Slide Review   


 


Anisocytosis   


 


Macrocytosis   


 


Target Cells   


 


PT   (9.0-12.0)  sec


 


INR   (<1.2)  


 


APTT   (22.0-30.0)  sec


 


Sodium   (137-145)  mmol/L


 


Potassium   (3.5-5.1)  mmol/L


 


Chloride   ()  mmol/L


 


Carbon Dioxide   (22-30)  mmol/L


 


Anion Gap   mmol/L


 


BUN   (7-17)  mg/dL


 


Creatinine   (0.52-1.04)  mg/dL


 


Est GFR (CKD-EPI)AfAm   (>60 ml/min/1.73 sqM)  


 


Est GFR (CKD-EPI)NonAf   (>60 ml/min/1.73 sqM)  


 


Glucose   (74-99)  mg/dL


 


Calcium   (8.4-10.2)  mg/dL


 


Total Bilirubin   (0.2-1.3)  mg/dL


 


AST   (14-36)  U/L


 


ALT   (9-52)  U/L


 


Alkaline Phosphatase   ()  U/L


 


Total Protein   (6.3-8.2)  g/dL


 


Albumin   (3.5-5.0)  g/dL


 


Amylase   ()  U/L


 


Lipase   ()  U/L


 


Urine Color  Dark Brown  


 


Urine Appearance  Clear  (Clear)  


 


Urine pH  5.5  (5.0-8.0)  


 


Ur Specific Gravity  1.026  (1.001-1.035)  


 


Urine Protein  Trace H  (Negative)  


 


Urine Glucose (UA)  Negative  (Negative)  


 


Urine Ketones  Negative  (Negative)  


 


Urine Blood  Negative  (Negative)  


 


Urine Nitrite  Negative  (Negative)  


 


Urine Bilirubin  4+ H  (Negative)  


 


Urine Urobilinogen  3.0  (<2.0)  mg/dL


 


Ur Leukocyte Esterase  Negative  (Negative)  














Disposition


Clinical Impression: 


 Jaundice of recent onset





Disposition: ADMITTED AS IP TO THIS HOSP


Is patient prescribed a controlled substance at d/c from ED?: No


Referrals: 


Patric Montoya DO [Primary Care Provider] - 1-2 days


Decision Time: 10:46

## 2019-09-09 NOTE — XR
EXAMINATION TYPE: XR KUB

 

DATE OF EXAM: 9/9/2019 10:10 AM

 

CLINICAL HISTORY:  Abdominal pain and nausea

 

TECHNIQUE:  Two Upright KUB images of the abdomen are obtained.

 

COMPARISON: CT October 20, 2017.

 

FINDINGS: Scattered gas is seen in non-distended stomach and small bowel loops. Gas and fecal materia
l is seen in non-distended colon. Underlying levoconvex scoliosis centered at L2 level redemonstrated
. Lung bases are clear. No pneumoperitoneum. Scattered pelvic phleboliths.

 

IMPRESSION: 

 

Overall nonobstructive bowel gas pattern.

## 2019-09-10 VITALS — SYSTOLIC BLOOD PRESSURE: 109 MMHG | TEMPERATURE: 97.7 F | DIASTOLIC BLOOD PRESSURE: 63 MMHG | HEART RATE: 68 BPM

## 2019-09-10 LAB
ALBUMIN SERPL-MCNC: 3 G/DL (ref 3.5–5)
ALP SERPL-CCNC: 403 U/L (ref 38–126)
ALT SERPL-CCNC: 309 U/L (ref 9–52)
AMYLASE SERPL-CCNC: 47 U/L (ref 30–110)
ANION GAP SERPL CALC-SCNC: 9 MMOL/L
AST SERPL-CCNC: 175 U/L (ref 14–36)
BUN SERPL-SCNC: 11 MG/DL (ref 7–17)
CALCIUM SPEC-MCNC: 8.9 MG/DL (ref 8.4–10.2)
CHLORIDE SERPL-SCNC: 107 MMOL/L (ref 98–107)
CO2 SERPL-SCNC: 23 MMOL/L (ref 22–30)
GLUCOSE SERPL-MCNC: 76 MG/DL (ref 74–99)
INR PPP: 1 (ref ?–1.2)
LDH SPEC-CCNC: 436 U/L (ref 313–618)
POTASSIUM SERPL-SCNC: 4.3 MMOL/L (ref 3.5–5.1)
PROT SERPL-MCNC: 5.8 G/DL (ref 6.3–8.2)
PT BLD: 11.1 SEC (ref 9–12)
SODIUM SERPL-SCNC: 139 MMOL/L (ref 137–145)

## 2019-09-10 RX ADMIN — APIXABAN SCH MG: 5 TABLET, FILM COATED ORAL at 07:16

## 2019-09-10 RX ADMIN — CEFAZOLIN SCH: 330 INJECTION, POWDER, FOR SOLUTION INTRAMUSCULAR; INTRAVENOUS at 12:38

## 2019-09-10 RX ADMIN — CEFAZOLIN SCH: 330 INJECTION, POWDER, FOR SOLUTION INTRAMUSCULAR; INTRAVENOUS at 06:10

## 2019-09-10 RX ADMIN — METOPROLOL TARTRATE SCH MG: 25 TABLET, FILM COATED ORAL at 07:16

## 2019-09-10 NOTE — P.PN
Subjective


Progress Note Date: 09/10/19


Principal diagnosis: 





Jaundice, pruritus





Objective





- Vital Signs


Vital signs: 


                                   Vital Signs











Temp  97.7 F   09/10/19 05:00


 


Pulse  68   09/10/19 05:00


 


Resp  20   09/10/19 05:00


 


BP  109/63   09/10/19 05:00


 


Pulse Ox  97   09/10/19 05:00








                                 Intake & Output











 09/09/19 09/10/19 09/10/19





 18:59 06:59 18:59


 


Intake Total  300 


 


Balance  300 


 


Weight 62.142 kg  


 


Intake:   


 


  Oral  300 


 


Other:   


 


  Voiding Method Toilet Toilet Toilet


 


  # Voids  2 1














- Exam





On physical examination, patient appears comfortable in no apparent distress. 


HEAD: Normocephalic, atraumatic. 


EYES: Scleral icterus. No conjunctival injection. 


MOUTH: No lesions, tongue midline. 


NECK: Trachea midline, no gross abnormalities. 


CHEST: Clear to auscultation with no wheezing or rhonchi appreciated. 


HEART: Regular rate and rhythm. 


ABDOMEN: Soft. Bowel sounds are positive. No organomegaly.  No guarding or 

rigidity.


EXTREMITIES: No pedal edema. 


SKIN: No rashes, jaundice. 


NEUROLOGIC: Alert and oriented x3.  No focal deficits. 





- Labs


CBC & Chem 7: 


                                 09/09/19 09:54





                                 09/10/19 07:27


Labs: 


                  Abnormal Lab Results - Last 24 Hours (Table)











  09/10/19 09/10/19 Range/Units





  07:27 07:27 


 


Iron Saturation   64.23 H  (12.00-45.00)   


 


Total Bilirubin  14.7 H   (0.2-1.3)  mg/dL


 


AST  175 H   (14-36)  U/L


 


ALT  309 H   (9-52)  U/L


 


Alkaline Phosphatase  403 H   ()  U/L


 


Total Protein  5.8 L   (6.3-8.2)  g/dL


 


Total Protein (PEP)   5.2 L  (6.2-8.2)  g/dL


 


Albumin  3.0 L   (3.5-5.0)  g/dL














Assessment and Plan


(1) Elevated liver enzymes


Narrative/Plan: 


78-year-old female who presented with a constellation of symptoms including 

jaundice, dark urine, pruritus and decreased oral intake.  Patient had elevation

in her liver enzymes and predominantly a cholestatic pattern with total 

bilirubin 13.6, alkaline phosphatase 175,  and MALT 413.  Patient does 

report recent antibiotic treatment initially with Augmentin which was 

subsequently switched to Bactrim.  Ultrasound of the abdomen was significant for

some findings of intrahepatic biliary dilation.  At this time suspicion is for 

drug-induced liver injury, however.  Serology ordered to rule out underlying 

intrinsic liver disease and negative so far, as well as computed tomography scan

of the abdomen ordered reporting some intrahepatic dilation with no discrete 

mass seen.  Patient has refused MRI as she is unable to tolerate the imaging 

study due to anxiety/panic attacks.


Status: Acute   Code(s): R74.8 - ABNORMAL LEVELS OF OTHER SERUM ENZYMES   SNOMED

Code(s): 965406786


   





(2) Pruritic disorder


Status: Acute   Code(s): L29.9 - PRURITUS, UNSPECIFIED   SNOMED Code(s): 

901019344


   





(3) Jaundice of recent onset


Status: Acute   Code(s): R17 - UNSPECIFIED JAUNDICE   SNOMED Code(s): 67561932


   





(4) Atrial fibrillation with RVR


Status: Acute   Code(s): I48.91 - UNSPECIFIED ATRIAL FIBRILLATION   SNOMED 

Code(s): 249652038946157


   


Plan: 





Supportive care


Okay for diet


Continue to monitor CBC, CMP, INR


Full liver serologies ordered and negative so far


Computed tomography scan did show some intrahepatic dilation and extensive 

discussion with the patient about treatment options at this time and she would 

like to be discharged for open MRI, with close follow-up to be scheduled with 

the gastroenterology office for possible ERCP for referral to tertiary center if

needed


Atarax ordered for pruritus, if patient continues to have symptoms can add a 

bile salt binder such as cholestyramine


Thank you for allowing us to participate in the care of the patient we will 

continue to follow

## 2019-09-10 NOTE — P.DS
Providers


Date of admission: 


09/10/19 11:07





Expected date of discharge: 09/10/19


Attending physician: 


Wyatt Melo





Consults: 





                                        





09/09/19 10:50


Consult Physician Urgent 


   Consulting Provider: Rusty Estrada


   Consult Reason/Comments: New-onset jaundice, hepatitis


   Do you want consulting provider notified?: Yes











Primary care physician: 


Patric Montoya





[facility] Course: 


Chief Complaint: Jaundiced








Hospital course:


This is a very pleasant 78-year-old patient of Dr. Montoya.  Chronic stable 

medical conditions include atrial fibrillation, osteoarthritis, GERD, scoliosis 

and rectal prolapse.  About a week ago patient noticed urine to be dark and 

decided to go and see if family doctor.  Was prescribed antibiotics and started 

on Bactrim.  About 4 days ago she noticed that she was getting more itching and 

noticed in the mirror about 2 days ago that she's becoming more yellow.  She 

also developed nausea.  Appetite hasn't been good for last week.  Also lost some

weight in the last 2 weeks.  Patient complains of chronic upper abdominal pain 

for many years on a life.  She thinks she may have had some hepatitis exposure 

in the 90s.  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  But there is no need to contact no

blood transfusion and no sexual activity to explain the same.  Patient's 

daughter-in-law is by the bedside.


Patient's clinical picture and biochemical picture suggestive of drug-induced 

cholestatic hepatitis.  Patient did have a computed tomography scan of the 

abdomen done today.  Results were inconclusive about a questionable mass.  

Patient needs an open MRI as she cannot do a MRI.  Did discuss with Dr. Ybarra

from GI.  At this point to see the patient as an outpatient and he'll decide 

between MRI and a ERCP.  Earlier care was discussed with the patient daughter.  

Today on the day of discharge patient's symptoms are better.  Nausea much 

improved, pruritus much improved, urine symptoms better.


Discussion and discharge planning more than 35 minutes





Consultation:


Dr. Ybarra from GI





Physical examination:


VITAL SIGNS: 97.7, 68, 20, 109/63, 97% room air


GENERAL: Sitting on chair, comfortable.


EYES: Pupils equal.  Conjunctiva icterusl.


HEENT: External appearance of nose and ears normal, oral cavity grossly normal.


NECK: JVD not raised; masses not palpable.


HEART: First and second heart sounds are normal;  no edema.  


LUNGS: Respiratory rate normal; clear to auscultation.  


ABDOMEN: Soft, right upper quadrant tenderness, no guarding or rigidity, liver 

spleen not palpable, no masses palpable.  


PSYCH: Alert and oriented x3;  mood  and affect normal.  


MUSCULOSKELETAL: Evidence of osteoarthritis especially in the hands





INVESTIGATIONS, reviewed in the clinical context:


Bilirubin 14.7   alk phos 4039 alpha-1 antitrypsin 131 cerebral 

plasmin 31.8 amylase lipase both normal


Computed tomography scan of the abdomen-central liver mass cannot be ruled out


White count 6 hemoglobin 13.6 platelets 242 potassium 4.6 bun 14 creatinine 0.74


Bilirubin 3.6   alkaline phosphatase 476


Ultrasound-showing intrahepatic stasis








Discharge diagnosis:


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


-Questionable liver mass to be worked up further as an outpatient


-History of atrial fibrillation


-Primary osteoarthritis


-GERD


-Chronic scoliosis


-Chronic rectal prolapse





Disposition:


Home








Patient Condition at Discharge: Undetermined





Plan - Discharge Summary


Discharge Rx Participant: No


New Discharge Prescriptions: 


Continue


   Multivit with Calcium,Iron,Min [Women's Daily Multivitamin] 1 tab PO DAILY


   Cholecalciferol [Vitamin D3 (25 Mcg = 1000 Iu)] 3,000 unit PO BID


   Apixaban [Eliquis] 5 mg PO BID #60 tab


   Metoprolol Tartrate [Lopressor] 25 mg PO BID


   Pantoprazole Sodium [Protonix] 40 mg PO DAILY





Discontinued


   Sulfamethox-Tmp 800-160Mg [Bactrim -160 mg] 1 tab PO Q12HR #14 tab


   Ondansetron [Zofran] 4 mg PO Q8H PRN


     PRN Reason: Nausea


Discharge Medication List





Multivit with Calcium,Iron,Min [Women's Daily Multivitamin] 1 tab PO DAILY 

09/01/16 [History]


Cholecalciferol [Vitamin D3 (25 Mcg = 1000 Iu)] 3,000 unit PO BID 07/26/17 

[History]


Apixaban [Eliquis] 5 mg PO BID #60 tab 07/27/17 [Rx]


Metoprolol Tartrate [Lopressor] 25 mg PO BID 02/19/18 [History]


Pantoprazole Sodium [Protonix] 40 mg PO DAILY 09/09/19 [History]








Follow up Appointment(s)/Referral(s): 


Patric Montoya DO [Primary Care Provider] - 1-2 days


Velocci,Rusty, MD [STAFF PHYSICIAN] - 1 Week


Ambulatory/Diagnostic Orders: 


Comprehensive Metabolic Panel [LAB.AMB] Time Frame: 1 Week, Location: None 

Selected


Patient Instructions/Handouts:  Jaundice (DC)


Activity/Diet/Wound Care/Special Instructions: 


MRi of the abdomen with and without contrast





Diagnosis: elevated bili, jaundice, abd ct scan of abd


Discharge Disposition: HOME SELF-CARE

## 2019-09-11 LAB
ALBUMIN SERPL ELPH-MCNC: 2.74 G/DL (ref 3.8–4.9)
GAMMA GLOB SERPL ELPH-MCNC: 0.67 G/DL (ref 0.7–1.5)
LKM AB SER-ACNC: 1.3 UNITS (ref ?–20)

## 2019-11-03 ENCOUNTER — HOSPITAL ENCOUNTER (EMERGENCY)
Dept: HOSPITAL 47 - EC | Age: 79
Discharge: ANOTHER HOSPITAL | End: 2019-11-03
Payer: MEDICARE

## 2019-11-03 VITALS — RESPIRATION RATE: 16 BRPM

## 2019-11-03 VITALS — SYSTOLIC BLOOD PRESSURE: 121 MMHG | TEMPERATURE: 98 F | DIASTOLIC BLOOD PRESSURE: 76 MMHG | HEART RATE: 76 BPM

## 2019-11-03 DIAGNOSIS — Z90.49: ICD-10-CM

## 2019-11-03 DIAGNOSIS — R79.89: ICD-10-CM

## 2019-11-03 DIAGNOSIS — C22.1: ICD-10-CM

## 2019-11-03 DIAGNOSIS — Z88.1: ICD-10-CM

## 2019-11-03 DIAGNOSIS — Z79.899: ICD-10-CM

## 2019-11-03 DIAGNOSIS — Z88.8: ICD-10-CM

## 2019-11-03 DIAGNOSIS — M54.9: ICD-10-CM

## 2019-11-03 DIAGNOSIS — R10.10: Primary | ICD-10-CM

## 2019-11-03 DIAGNOSIS — K21.9: ICD-10-CM

## 2019-11-03 DIAGNOSIS — Z96.89: ICD-10-CM

## 2019-11-03 DIAGNOSIS — Z91.048: ICD-10-CM

## 2019-11-03 DIAGNOSIS — R11.0: ICD-10-CM

## 2019-11-03 LAB
ALBUMIN SERPL-MCNC: 3.6 G/DL (ref 3.5–5)
ALP SERPL-CCNC: 559 U/L (ref 38–126)
ALT SERPL-CCNC: 145 U/L (ref 9–52)
AMYLASE SERPL-CCNC: 37 U/L (ref 30–110)
ANION GAP SERPL CALC-SCNC: 9 MMOL/L
AST SERPL-CCNC: 114 U/L (ref 14–36)
BASOPHILS # BLD AUTO: 0 K/UL (ref 0–0.2)
BASOPHILS NFR BLD AUTO: 1 %
BUN SERPL-SCNC: 13 MG/DL (ref 7–17)
CALCIUM SPEC-MCNC: 9.4 MG/DL (ref 8.4–10.2)
CHLORIDE SERPL-SCNC: 105 MMOL/L (ref 98–107)
CO2 SERPL-SCNC: 25 MMOL/L (ref 22–30)
EOSINOPHIL # BLD AUTO: 0.2 K/UL (ref 0–0.7)
EOSINOPHIL NFR BLD AUTO: 3 %
ERYTHROCYTE [DISTWIDTH] IN BLOOD BY AUTOMATED COUNT: 3.97 M/UL (ref 3.8–5.4)
ERYTHROCYTE [DISTWIDTH] IN BLOOD: 14.4 % (ref 11.5–15.5)
GLUCOSE SERPL-MCNC: 122 MG/DL (ref 74–99)
HCT VFR BLD AUTO: 39.2 % (ref 34–46)
HGB BLD-MCNC: 12.7 GM/DL (ref 11.4–16)
LYMPHOCYTES # SPEC AUTO: 1.3 K/UL (ref 1–4.8)
LYMPHOCYTES NFR SPEC AUTO: 21 %
MCH RBC QN AUTO: 32 PG (ref 25–35)
MCHC RBC AUTO-ENTMCNC: 32.4 G/DL (ref 31–37)
MCV RBC AUTO: 98.7 FL (ref 80–100)
MONOCYTES # BLD AUTO: 0.5 K/UL (ref 0–1)
MONOCYTES NFR BLD AUTO: 8 %
NEUTROPHILS # BLD AUTO: 3.9 K/UL (ref 1.3–7.7)
NEUTROPHILS NFR BLD AUTO: 64 %
PLATELET # BLD AUTO: 299 K/UL (ref 150–450)
POTASSIUM SERPL-SCNC: 4.1 MMOL/L (ref 3.5–5.1)
PROT SERPL-MCNC: 7 G/DL (ref 6.3–8.2)
SODIUM SERPL-SCNC: 139 MMOL/L (ref 137–145)
WBC # BLD AUTO: 6.1 K/UL (ref 3.8–10.6)

## 2019-11-03 PROCEDURE — 85025 COMPLETE CBC W/AUTO DIFF WBC: CPT

## 2019-11-03 PROCEDURE — 36415 COLL VENOUS BLD VENIPUNCTURE: CPT

## 2019-11-03 PROCEDURE — 96375 TX/PRO/DX INJ NEW DRUG ADDON: CPT

## 2019-11-03 PROCEDURE — 83690 ASSAY OF LIPASE: CPT

## 2019-11-03 PROCEDURE — 96374 THER/PROPH/DIAG INJ IV PUSH: CPT

## 2019-11-03 PROCEDURE — 74177 CT ABD & PELVIS W/CONTRAST: CPT

## 2019-11-03 PROCEDURE — 99285 EMERGENCY DEPT VISIT HI MDM: CPT

## 2019-11-03 PROCEDURE — 82150 ASSAY OF AMYLASE: CPT

## 2019-11-03 PROCEDURE — 96361 HYDRATE IV INFUSION ADD-ON: CPT

## 2019-11-03 PROCEDURE — 96376 TX/PRO/DX INJ SAME DRUG ADON: CPT

## 2019-11-03 PROCEDURE — 80053 COMPREHEN METABOLIC PANEL: CPT

## 2019-11-03 NOTE — ED
General Adult HPI





- General


Chief complaint: Abdominal Pain


Stated complaint: Abd Pain


Time Seen by Provider: 19 12:00


Source: patient


Mode of arrival: wheelchair


Limitations: no limitations





- History of Present Illness


Initial comments: 





Patient presents the ED with her grandson for evaluation.  Patient states that 

she is 5 days status post ERCP with bile duct stent placement performed at 

[facility] by Dr. Yan.  Patient states that she has been 

diagnosed with cholangiocarcinoma.  Patient states that she has had diffuse up

per abdominal pain radiating to her back since her ERCP.  Patient states that 

she has also felt "bloated".  Patient also admits to feeling nauseated.  Patient

states that she has been taking the pain medication that was prescribed to her 

with minimal relief.  Patient states that her pain is currently 7 out of 10.  

Patient denies trauma or injury, fever or chills, headache, chest pain, dyspnea,

dizziness, lower abdominal pain, vomiting, diarrhea or constipation, bloody or 

melanotic stool, dysuria/hematuria/urinary symptoms, or any other symptoms or 

complaints.





- Related Data


                                Home Medications











 Medication  Instructions  Recorded  Confirmed


 


Pantoprazole Sodium [Protonix] 40 mg PO DAILY 19


 


Acetaminophen Tab [Tylenol Tab] 325 mg PO Q4H PRN 19


 


Metoprolol Tartrate [Lopressor] 12.5 mg PO BID 19








                                  Previous Rx's











 Medication  Instructions  Recorded


 


Apixaban [Eliquis] 5 mg PO BID #60 tab 17











                                    Allergies











Allergy/AdvReac Type Severity Reaction Status Date / Time


 


adhesive tape Allergy  Unknown Verified 19 12:28


 


cefuroxime Allergy  Itching Verified 19 12:28


 


levofloxacin [From Levaquin] Allergy  Rash/Hives Verified 19 12:28


 


Additives in inhalers AdvReac  Rapid Uncoded 19 12:28





   Heart Rate  














Review of Systems


ROS Statement: 


Those systems with pertinent positive or pertinent negative responses have been 

documented in the HPI.





ROS Other: All systems not noted in ROS Statement are negative.





Past Medical History


Past Medical History: Atrial Fibrillation, GERD/Reflux, Osteoarthritis (OA)


Additional Past Medical History / Comment(s): Possible hepatitis B years ago, 

UTIs, cardiac murmur, scoliosis,  DJD, cervical pain d/t position, ocular 

migraines, occasional numbness/tingling to toes bilateral feet, hiatal hernia, 

rectal prolapse, bile duct carncinoma


History of Any Multi-Drug Resistant Organisms: None Reported


Past Surgical History: Appendectomy, Breast Surgery, Hernia Repair, Hysterectomy


Additional Past Surgical History / Comment(s): Umbilical hernia repair, EGD, 

colonoscopy, bilateral cataract removals/lens implants, L breast benign biopsy, 

ercp


Past Anesthesia/Blood Transfusion Reactions: Family History of Problems w/ 

Anesthesia, Postoperative Nausea & Vomiting (PONV)


Additional Past Anesthesia/Blood Transfusion Reaction / Comment(s): Pt states 

her mother's heart stopped during gallbladder surgery.


Past Psychological History: No Psychological Hx Reported


Smoking Status: Never smoker


Past Alcohol Use History: None Reported


Past Drug Use History: None Reported





- Past Family History


  ** Mother


Family Medical History: Coronary Artery Disease (CAD), CVA/TIA


Additional Family Medical History / Comment(s): Mother had cardiac valve 

disease.  She  from a postop CVA.





  ** Father


Additional Family Medical History / Comment(s): Father had myasthenia gravis.





General Exam


Limitations: no limitations


General appearance: alert, in no apparent distress


Head exam: Present: atraumatic, normocephalic


Eye exam: Present: normal appearance, EOMI


ENT exam: Present: mucous membranes moist


Respiratory exam: Present: normal lung sounds bilaterally.  Absent: respiratory 

distress, wheezes, rales, rhonchi


Cardiovascular Exam: Present: regular rate, normal rhythm, normal heart sounds, 

other (Normal radial pulses bilaterally)


GI/Abdominal exam: Present: soft, other (Moderate epigastric and right upper 

quadrant tenderness).  Absent: distended, guarding, rebound


Extremities exam: Absent: tenderness, pedal edema, calf tenderness


Back exam: Absent: tenderness, CVA tenderness (R), CVA tenderness (L)


Neurological exam: Present: alert, oriented X3


Psychiatric exam: Present: normal affect, normal mood


Skin exam: Present: warm, dry, intact, normal color





Course


                                   Vital Signs











  19





  11:42 14:20


 


Temperature 98.1 F 


 


Pulse Rate 113 H 99


 


Respiratory 18 16





Rate  


 


Blood Pressure 121/73 149/72


 


O2 Sat by Pulse 98 99





Oximetry  














- Reevaluation(s)


Reevaluation #1: 





19 16:15


Patient states that her pain has improved with ED treatment.  Patient and 

daughter are aware of the patient's test results.  Patient agrees with transfer 

back to [facility] if deemed necessary.


Reevaluation #2: 





19 16:39


Case, H&P and test results/CT report were discussed with Dr. Vincent who is a 

surgeon at [facility].  He recommends transferring the 

patient to their ED, and letting the ED know that I have spoken with him.  He 

has no further recommendations at this time.


19 16:44


Case, H&P, test results/CT report and my discussion with Dr. Vincent as above 

were discussed with Dr. Muñoz who is an ED physician at [facility].  He accepts ambulance transfer to their ED.  He has no further 

recommendations at this time.





Medical Decision Making





- Medical Decision Making





Given the patient's pain, CT findings, and elevated liver function tests, I 

suspect that the patient may have a biliary obstruction.  Patient is afebrile 

and without leukocytosis.  Case was discussed with physicians at the [facility], and they have accepted the patient for transfer to their 

ED.  Patient and daughter are aware of my discussions and the patient's test 

results, and they agree with ambulance transfer to [facility] at this time.





- Lab Data


Result diagrams: 


                                 19 12:50





                                 19 12:50


                                   Lab Results











  19 Range/Units





  12:50 12:50 


 


WBC   6.1  (3.8-10.6)  k/uL


 


RBC   3.97  (3.80-5.40)  m/uL


 


Hgb   12.7  (11.4-16.0)  gm/dL


 


Hct   39.2  (34.0-46.0)  %


 


MCV   98.7  (80.0-100.0)  fL


 


MCH   32.0  (25.0-35.0)  pg


 


MCHC   32.4  (31.0-37.0)  g/dL


 


RDW   14.4  (11.5-15.5)  %


 


Plt Count   299  (150-450)  k/uL


 


Neutrophils %   64  %


 


Lymphocytes %   21  %


 


Monocytes %   8  %


 


Eosinophils %   3  %


 


Basophils %   1  %


 


Neutrophils #   3.9  (1.3-7.7)  k/uL


 


Lymphocytes #   1.3  (1.0-4.8)  k/uL


 


Monocytes #   0.5  (0-1.0)  k/uL


 


Eosinophils #   0.2  (0-0.7)  k/uL


 


Basophils #   0.0  (0-0.2)  k/uL


 


Sodium  139   (137-145)  mmol/L


 


Potassium  4.1   (3.5-5.1)  mmol/L


 


Chloride  105   ()  mmol/L


 


Carbon Dioxide  25   (22-30)  mmol/L


 


Anion Gap  9   mmol/L


 


BUN  13   (7-17)  mg/dL


 


Creatinine  0.57   (0.52-1.04)  mg/dL


 


Est GFR (CKD-EPI)AfAm  >90   (>60 ml/min/1.73 sqM)  


 


Est GFR (CKD-EPI)NonAf  89   (>60 ml/min/1.73 sqM)  


 


Glucose  122 H   (74-99)  mg/dL


 


Calcium  9.4   (8.4-10.2)  mg/dL


 


Total Bilirubin  3.1 H   (0.2-1.3)  mg/dL


 


AST  114 H   (14-36)  U/L


 


ALT  145 H   (9-52)  U/L


 


Alkaline Phosphatase  559 H   ()  U/L


 


Total Protein  7.0   (6.3-8.2)  g/dL


 


Albumin  3.6   (3.5-5.0)  g/dL


 


Amylase  37   ()  U/L


 


Lipase  68   ()  U/L














- Radiology Data


Radiology results: report reviewed (CT abdomen/pelvis with IV contrast shows a 

stent in the common bile duct, dilation of the biliary tree and gallbladder, and

suspicion for stent or distal common bile duct obstruction)





Disposition


Clinical Impression: 


 Abdominal pain





Narrative: 





Suspected biliary obstruction


Disposition: OTHER INSTITUTION NOT DEFINED


Condition: Stable


Is patient prescribed a controlled substance at d/c from ED?: No


Referrals: 


Patric Montoya DO [Primary Care Provider] - 1-2 days


Time of Disposition: 16:44





- Out of Hospital Transfer - Req. Specs


Out of Hospital Transfer - Requested Specifics: Other Emergency Center ([facility] emergency department)

## 2019-11-03 NOTE — CT
EXAMINATION TYPE: CT abdomen pelvis w con

 

DATE OF EXAM: 11/3/2019

 

COMPARISON: 9/9/2019

 

HISTORY: Upper Abdominal pain with history of bile duct cancer.

 

CT DLP: 707.2 mGycm

Automated exposure control for dose reduction was used.

 

TECHNIQUE:  Helical acquisition of images was performed from the lung bases through the pelvis.

 

CONTRAST: 

Performed without Oral Contrast and with IV Contrast, patient injected with 100 mL of Isovue 300.

 

FINDINGS: 

 

There is some mild atelectasis at the lung bases. Heart is slightly enlarged. There is no pericardial
 effusion. There is stent in the common bile duct. The gallbladder is dilated and measures 6 cm in di
ameter. Spleen is normal. There is no sign of a pancreatic mass. The stent in the common bile duct ex
tends to the distal common bile duct but not definitely into the duodenum. There is hypodensity at th
e rishabh hepatis in the left lobe probably due to confluence of dilated bile ducts.

 

There is no adrenal mass. There are a few small cortical cysts on both kidneys that measure less than
 1 cm. There is no hydronephrosis. There is no evidence of a solid renal mass. Abdominal aorta is ath
eromatous. There is no retroperitoneal adenopathy. There is thoracolumbar levoscoliosis. Urinary blad
alex is intact. There is no inguinal hernia. There is no ascites or free air. I see no sign of a bowel
 obstruction. There is no inguinal hernia. There is no evidence of focal bone destruction. Bony pelvi
s appears intact.

IMPRESSION: 

THERE IS A STENT IN THE COMMON BILE DUCT. THERE IS DILATION OF THE BILIARY TREE AND THE GALLBLADDER. 
OBSTRUCTION OF THE STENT OR THE DISTAL COMMON BILE DUCT IS SUSPECTED. DILATED GALLBLADDER IS A CHANGE
 COMPARED TO LAST EXAM. DILATED BILIARY TREE APPEARS SLIGHTLY WORSE THAN LAST EXAM.

 

POORLY MARGINATED HYPODENSE AREA AT THE RISHABH HEPATIS COULD RELATE TO CONFLUENCE OF DILATED DUCTS AND
 IS UNCHANGED. Tumor at the rishabh hepatis is possible.

 

There is some atelectasis at the lung bases slightly worse than last exam.

## 2019-11-25 ENCOUNTER — HOSPITAL ENCOUNTER (OUTPATIENT)
Dept: HOSPITAL 47 - LABWHC1 | Age: 79
Discharge: HOME | End: 2019-11-25
Attending: SURGERY
Payer: MEDICARE

## 2019-11-25 DIAGNOSIS — C22.1: Primary | ICD-10-CM

## 2019-11-25 LAB
ALBUMIN SERPL-MCNC: 3.9 G/DL (ref 3.8–4.9)
ALBUMIN/GLOB SERPL: 1.56 G/DL (ref 1.6–3.17)
ALP SERPL-CCNC: 492 U/L (ref 41–126)
ALT SERPL-CCNC: 82 U/L (ref 8–44)
ANION GAP SERPL CALC-SCNC: 9.2 MMOL/L (ref 4–12)
AST SERPL-CCNC: 71 U/L (ref 13–35)
BASOPHILS # BLD AUTO: 0.2 K/UL (ref 0–0.2)
BASOPHILS NFR BLD AUTO: 2 %
BUN SERPL-SCNC: 16 MG/DL (ref 9–27)
BUN/CREAT SERPL: 22.86 RATIO (ref 12–20)
CALCIUM SPEC-MCNC: 9.6 MG/DL (ref 8.7–10.3)
CHLORIDE SERPL-SCNC: 105 MMOL/L (ref 96–109)
CO2 SERPL-SCNC: 28.8 MMOL/L (ref 21.6–31.8)
EOSINOPHIL # BLD AUTO: 0.4 K/UL (ref 0–0.7)
EOSINOPHIL NFR BLD AUTO: 6 %
ERYTHROCYTE [DISTWIDTH] IN BLOOD BY AUTOMATED COUNT: 4.22 M/UL (ref 3.8–5.4)
ERYTHROCYTE [DISTWIDTH] IN BLOOD: 13.9 % (ref 11.5–15.5)
GLOBULIN SER CALC-MCNC: 2.5 G/DL (ref 1.6–3.3)
GLUCOSE SERPL-MCNC: 105 MG/DL (ref 70–110)
HCT VFR BLD AUTO: 41.5 % (ref 34–46)
HGB BLD-MCNC: 12.7 GM/DL (ref 11.4–16)
LYMPHOCYTES # SPEC AUTO: 1.7 K/UL (ref 1–4.8)
LYMPHOCYTES NFR SPEC AUTO: 25 %
MCH RBC QN AUTO: 30.1 PG (ref 25–35)
MCHC RBC AUTO-ENTMCNC: 30.6 G/DL (ref 31–37)
MCV RBC AUTO: 98.2 FL (ref 80–100)
MONOCYTES # BLD AUTO: 0.6 K/UL (ref 0–1)
MONOCYTES NFR BLD AUTO: 9 %
NEUTROPHILS # BLD AUTO: 3.7 K/UL (ref 1.3–7.7)
NEUTROPHILS NFR BLD AUTO: 55 %
PLATELET # BLD AUTO: 309 K/UL (ref 150–450)
POTASSIUM SERPL-SCNC: 4.9 MMOL/L (ref 3.5–5.5)
PROT SERPL-MCNC: 6.4 G/DL (ref 6.2–8.2)
SODIUM SERPL-SCNC: 143 MMOL/L (ref 135–145)
WBC # BLD AUTO: 6.8 K/UL (ref 3.8–10.6)

## 2019-11-25 PROCEDURE — 80053 COMPREHEN METABOLIC PANEL: CPT

## 2019-11-25 PROCEDURE — 36415 COLL VENOUS BLD VENIPUNCTURE: CPT

## 2019-11-25 PROCEDURE — 85025 COMPLETE CBC W/AUTO DIFF WBC: CPT

## 2020-03-06 ENCOUNTER — HOSPITAL ENCOUNTER (OUTPATIENT)
Dept: HOSPITAL 47 - LABWHC1 | Age: 80
Discharge: HOME | End: 2020-03-06
Attending: NURSE PRACTITIONER
Payer: MEDICARE

## 2020-03-06 DIAGNOSIS — C24.0: Primary | ICD-10-CM

## 2020-03-06 LAB
ALBUMIN SERPL-MCNC: 4.1 G/DL (ref 3.8–4.9)
ALBUMIN/GLOB SERPL: 1.64 G/DL (ref 1.6–3.17)
ALP SERPL-CCNC: 512 U/L (ref 41–126)
ALT SERPL-CCNC: 102 U/L (ref 8–44)
ANION GAP SERPL CALC-SCNC: 11.5 MMOL/L (ref 4–12)
AST SERPL-CCNC: 118 U/L (ref 13–35)
BUN SERPL-SCNC: 16 MG/DL (ref 9–27)
BUN/CREAT SERPL: 20 RATIO (ref 12–20)
CALCIUM SPEC-MCNC: 9.3 MG/DL (ref 8.7–10.3)
CELLS COUNTED: 100
CHLORIDE SERPL-SCNC: 105 MMOL/L (ref 96–109)
CO2 SERPL-SCNC: 24.5 MMOL/L (ref 21.6–31.8)
EOSINOPHIL # BLD MANUAL: 0.17 K/UL (ref 0–0.7)
ERYTHROCYTE [DISTWIDTH] IN BLOOD BY AUTOMATED COUNT: 4.23 M/UL (ref 3.8–5.4)
ERYTHROCYTE [DISTWIDTH] IN BLOOD: 14.4 % (ref 11.5–15.5)
GLOBULIN SER CALC-MCNC: 2.5 G/DL (ref 1.6–3.3)
GLUCOSE SERPL-MCNC: 98 MG/DL (ref 70–110)
HCT VFR BLD AUTO: 41 % (ref 34–46)
HGB BLD-MCNC: 13.1 GM/DL (ref 11.4–16)
INR PPP: 0.9 (ref ?–1.2)
LYMPHOCYTES # BLD MANUAL: 0.92 K/UL (ref 1–4.8)
MCH RBC QN AUTO: 31 PG (ref 25–35)
MCHC RBC AUTO-ENTMCNC: 32 G/DL (ref 31–37)
MCV RBC AUTO: 96.9 FL (ref 80–100)
MONOCYTES # BLD MANUAL: 0.46 K/UL (ref 0–1)
NEUTROPHILS NFR BLD MANUAL: 63 %
NEUTS SEG # BLD MANUAL: 2.65 K/UL (ref 1.3–7.7)
PLATELET # BLD AUTO: 213 K/UL (ref 150–450)
POTASSIUM SERPL-SCNC: 4.3 MMOL/L (ref 3.5–5.5)
PROT SERPL-MCNC: 6.6 G/DL (ref 6.2–8.2)
PT BLD: 9.9 SEC (ref 9–12)
SODIUM SERPL-SCNC: 141 MMOL/L (ref 135–145)
WBC # BLD AUTO: 4.2 K/UL (ref 3.8–10.6)

## 2020-03-06 PROCEDURE — 80053 COMPREHEN METABOLIC PANEL: CPT

## 2020-03-06 PROCEDURE — 85025 COMPLETE CBC W/AUTO DIFF WBC: CPT

## 2020-03-06 PROCEDURE — 86301 IMMUNOASSAY TUMOR CA 19-9: CPT

## 2020-03-06 PROCEDURE — 85610 PROTHROMBIN TIME: CPT

## 2020-03-06 PROCEDURE — 36415 COLL VENOUS BLD VENIPUNCTURE: CPT

## 2020-05-13 ENCOUNTER — HOSPITAL ENCOUNTER (INPATIENT)
Dept: HOSPITAL 47 - EC | Age: 80
LOS: 3 days | Discharge: HOME | DRG: 394 | End: 2020-05-16
Attending: HOSPITALIST | Admitting: HOSPITALIST
Payer: MEDICARE

## 2020-05-13 DIAGNOSIS — Z98.42: ICD-10-CM
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
## 2020-05-13 DIAGNOSIS — I48.19: ICD-10-CM

## 2020-05-13 DIAGNOSIS — Z88.1: ICD-10-CM

## 2020-05-13 DIAGNOSIS — Z85.09: ICD-10-CM

## 2020-05-13 DIAGNOSIS — T85.510A: ICD-10-CM

## 2020-05-13 DIAGNOSIS — Z87.440: ICD-10-CM

## 2020-05-13 DIAGNOSIS — K82.1: ICD-10-CM

## 2020-05-13 DIAGNOSIS — Z79.899: ICD-10-CM

## 2020-05-13 DIAGNOSIS — K21.9: ICD-10-CM

## 2020-05-13 DIAGNOSIS — Z98.41: ICD-10-CM

## 2020-05-13 DIAGNOSIS — R74.0: ICD-10-CM

## 2020-05-13 DIAGNOSIS — Z92.3: ICD-10-CM

## 2020-05-13 DIAGNOSIS — Z84.89: ICD-10-CM

## 2020-05-13 DIAGNOSIS — G89.29: ICD-10-CM

## 2020-05-13 DIAGNOSIS — K44.9: ICD-10-CM

## 2020-05-13 DIAGNOSIS — M19.90: ICD-10-CM

## 2020-05-13 DIAGNOSIS — Z82.49: ICD-10-CM

## 2020-05-13 DIAGNOSIS — Z90.49: ICD-10-CM

## 2020-05-13 DIAGNOSIS — Z11.59: ICD-10-CM

## 2020-05-13 DIAGNOSIS — R42: ICD-10-CM

## 2020-05-13 DIAGNOSIS — Z79.01: ICD-10-CM

## 2020-05-13 DIAGNOSIS — Z90.710: ICD-10-CM

## 2020-05-13 DIAGNOSIS — Z91.048: ICD-10-CM

## 2020-05-13 DIAGNOSIS — M54.2: ICD-10-CM

## 2020-05-13 DIAGNOSIS — Z82.3: ICD-10-CM

## 2020-05-13 DIAGNOSIS — M41.9: ICD-10-CM

## 2020-05-13 DIAGNOSIS — Z96.1: ICD-10-CM

## 2020-05-13 DIAGNOSIS — K55.031: Primary | ICD-10-CM

## 2020-05-13 LAB
ALBUMIN SERPL-MCNC: 3.7 G/DL (ref 3.5–5)
ALP SERPL-CCNC: 323 U/L (ref 38–126)
ALT SERPL-CCNC: 59 U/L (ref 4–34)
ANION GAP SERPL CALC-SCNC: 7 MMOL/L
APTT BLD: 25.1 SEC (ref 22–30)
AST SERPL-CCNC: 60 U/L (ref 14–36)
BASOPHILS # BLD AUTO: 0 K/UL (ref 0–0.2)
BASOPHILS NFR BLD AUTO: 0 %
BUN SERPL-SCNC: 13 MG/DL (ref 7–17)
CALCIUM SPEC-MCNC: 9.1 MG/DL (ref 8.4–10.2)
CHLORIDE SERPL-SCNC: 105 MMOL/L (ref 98–107)
CO2 SERPL-SCNC: 25 MMOL/L (ref 22–30)
EOSINOPHIL # BLD AUTO: 0.2 K/UL (ref 0–0.7)
EOSINOPHIL NFR BLD AUTO: 2 %
ERYTHROCYTE [DISTWIDTH] IN BLOOD BY AUTOMATED COUNT: 3.94 M/UL (ref 3.8–5.4)
ERYTHROCYTE [DISTWIDTH] IN BLOOD BY AUTOMATED COUNT: 4.39 M/UL (ref 3.8–5.4)
ERYTHROCYTE [DISTWIDTH] IN BLOOD: 13.7 % (ref 11.5–15.5)
ERYTHROCYTE [DISTWIDTH] IN BLOOD: 13.8 % (ref 11.5–15.5)
GLUCOSE SERPL-MCNC: 127 MG/DL (ref 74–99)
HCT VFR BLD AUTO: 38.8 % (ref 34–46)
HCT VFR BLD AUTO: 42.2 % (ref 34–46)
HGB BLD-MCNC: 12.1 GM/DL (ref 11.4–16)
HGB BLD-MCNC: 13.2 GM/DL (ref 11.4–16)
INR PPP: 1 (ref ?–1.2)
LYMPHOCYTES # SPEC AUTO: 1 K/UL (ref 1–4.8)
LYMPHOCYTES NFR SPEC AUTO: 12 %
MAGNESIUM SPEC-SCNC: 1.8 MG/DL (ref 1.6–2.3)
MCH RBC QN AUTO: 30.1 PG (ref 25–35)
MCH RBC QN AUTO: 30.7 PG (ref 25–35)
MCHC RBC AUTO-ENTMCNC: 31.2 G/DL (ref 31–37)
MCHC RBC AUTO-ENTMCNC: 31.3 G/DL (ref 31–37)
MCV RBC AUTO: 96.3 FL (ref 80–100)
MCV RBC AUTO: 98.6 FL (ref 80–100)
MONOCYTES # BLD AUTO: 0.6 K/UL (ref 0–1)
MONOCYTES NFR BLD AUTO: 8 %
NEUTROPHILS # BLD AUTO: 6.3 K/UL (ref 1.3–7.7)
NEUTROPHILS NFR BLD AUTO: 74 %
PLATELET # BLD AUTO: 177 K/UL (ref 150–450)
PLATELET # BLD AUTO: 212 K/UL (ref 150–450)
POTASSIUM SERPL-SCNC: 3.9 MMOL/L (ref 3.5–5.1)
PROT SERPL-MCNC: 6.7 G/DL (ref 6.3–8.2)
PT BLD: 10.5 SEC (ref 9–12)
SODIUM SERPL-SCNC: 137 MMOL/L (ref 137–145)
WBC # BLD AUTO: 8.5 K/UL (ref 3.8–10.6)
WBC # BLD AUTO: 8.5 K/UL (ref 3.8–10.6)

## 2020-05-13 PROCEDURE — 85610 PROTHROMBIN TIME: CPT

## 2020-05-13 PROCEDURE — 87046 STOOL CULTR AEROBIC BACT EA: CPT

## 2020-05-13 PROCEDURE — 83690 ASSAY OF LIPASE: CPT

## 2020-05-13 PROCEDURE — 80076 HEPATIC FUNCTION PANEL: CPT

## 2020-05-13 PROCEDURE — 99284 EMERGENCY DEPT VISIT MOD MDM: CPT

## 2020-05-13 PROCEDURE — 74177 CT ABD & PELVIS W/CONTRAST: CPT

## 2020-05-13 PROCEDURE — 85730 THROMBOPLASTIN TIME PARTIAL: CPT

## 2020-05-13 PROCEDURE — 83630 LACTOFERRIN FECAL (QUAL): CPT

## 2020-05-13 PROCEDURE — 87045 FECES CULTURE AEROBIC BACT: CPT

## 2020-05-13 PROCEDURE — 83735 ASSAY OF MAGNESIUM: CPT

## 2020-05-13 PROCEDURE — 87635 SARS-COV-2 COVID-19 AMP PRB: CPT

## 2020-05-13 PROCEDURE — 84484 ASSAY OF TROPONIN QUANT: CPT

## 2020-05-13 PROCEDURE — 85027 COMPLETE CBC AUTOMATED: CPT

## 2020-05-13 PROCEDURE — 80048 BASIC METABOLIC PNL TOTAL CA: CPT

## 2020-05-13 PROCEDURE — 87324 CLOSTRIDIUM AG IA: CPT

## 2020-05-13 PROCEDURE — 85025 COMPLETE CBC W/AUTO DIFF WBC: CPT

## 2020-05-13 PROCEDURE — 76705 ECHO EXAM OF ABDOMEN: CPT

## 2020-05-13 PROCEDURE — 82272 OCCULT BLD FECES 1-3 TESTS: CPT

## 2020-05-13 PROCEDURE — 83605 ASSAY OF LACTIC ACID: CPT

## 2020-05-13 PROCEDURE — 80053 COMPREHEN METABOLIC PANEL: CPT

## 2020-05-13 PROCEDURE — 36415 COLL VENOUS BLD VENIPUNCTURE: CPT

## 2020-05-13 RX ADMIN — METRONIDAZOLE SCH MLS/HR: 500 INJECTION, SOLUTION INTRAVENOUS at 20:05

## 2020-05-13 RX ADMIN — METOPROLOL TARTRATE SCH MG: 25 TABLET, FILM COATED ORAL at 07:54

## 2020-05-13 RX ADMIN — METRONIDAZOLE SCH MLS/HR: 500 INJECTION, SOLUTION INTRAVENOUS at 10:02

## 2020-05-13 RX ADMIN — METOPROLOL TARTRATE SCH MG: 25 TABLET, FILM COATED ORAL at 20:40

## 2020-05-13 RX ADMIN — CEFAZOLIN SCH MLS/HR: 330 INJECTION, POWDER, FOR SOLUTION INTRAMUSCULAR; INTRAVENOUS at 16:17

## 2020-05-13 RX ADMIN — MORPHINE SULFATE PRN MG: 4 INJECTION, SOLUTION INTRAMUSCULAR; INTRAVENOUS at 05:53

## 2020-05-13 RX ADMIN — PANTOPRAZOLE SODIUM SCH MG: 40 TABLET, DELAYED RELEASE ORAL at 07:55

## 2020-05-13 RX ADMIN — CEFAZOLIN SCH MLS/HR: 330 INJECTION, POWDER, FOR SOLUTION INTRAMUSCULAR; INTRAVENOUS at 03:36

## 2020-05-13 RX ADMIN — CEFDINIR SCH MG: 300 CAPSULE ORAL at 10:02

## 2020-05-13 RX ADMIN — METRONIDAZOLE SCH MLS/HR: 500 INJECTION, SOLUTION INTRAVENOUS at 03:52

## 2020-05-13 RX ADMIN — CEFDINIR SCH MG: 300 CAPSULE ORAL at 20:40

## 2020-05-13 RX ADMIN — CEFAZOLIN SCH MLS/HR: 330 INJECTION, POWDER, FOR SOLUTION INTRAMUSCULAR; INTRAVENOUS at 23:20

## 2020-05-13 NOTE — P.HPIM
History of Present Illness





This is a pleasant 79 years old female with past medical history of atrial 

fibrillation on Eliquis, osteoarthritis, GERD, possible hepatitis B, cervical 

pain, occasional numbness in bilateral feet hiatal hernia, rectal prolapse, bile

duct carcinoma.  Presents because of abdominal pain and diarrhea.  Patient 

started having abdominal pain for about one week within THE tendon progressively

worse, and the left lower quadrant, non-radiating, felt like achy/colicky, about

8/10 in severity on the presentation associated with nausea but no vomiting.  

Patient has also diarrhea for example yesterday she had 11-12 bouts of bowel 

movements, they were loose and watery with little blood and that she had 1 large

bloody bowel movement associated with lightheadedness and decided to come to the

hospital


Patient denies fever or chills, no coughing, no chest pain or dyspnea.


She's the patient's of Dr. Montoya, she follows up at [facility] for

her radiotherapy to her bile duct cancer, her stents was placed also at 

[facility].  She follows up with Dr. Murry for her A. fib.


She denies smoking alcohol or illicit drugs





Vitals are stable.  Labs include CBC, BMP and INR were unremarkable. Hemoglobin 

is 13.2, occult blood in the stool is positive.  Liver enzymes slightly elevated

AST and ALT at 60/59 respectively.  Bilirubin is normal at 0.6


CT of the abdomen and pelvis showing biliary stent, markedly dilated 

gallbladder, markedly dilated biliary tree.  Diffuse thickening of the 

descending colon suspicious for colitis


In the emergency room he was given 500 mL of normal saline and started on Flagyl

and ceftriaxone and and normal saline 100 mL per hour and Protonix and Pepcid . 

C. diff test was negative


GI service was consulted


Coronavirus test is pending














Review of Systems





CONSTITUTIONAL: No fever, no malaise, no fatigue. 


HEENT: No recent visual problems or hearing problems. Denied any sore throat. 


CARDIOVASCULAR: No  orthopnea, PND, no palpitations, no syncope. 


PULMONARY: No shortness of breath, no cough, no hemoptysis. 


GASTROINTESTINAL: No diarrhea, no nausea, no vomiting, no abdominal pain. 

Normoactive bowel sounds. 


NEUROLOGICAL: No headaches, no weakness, no numbness. 


HEMATOLOGICAL: Denies any bleeding or petechiae. 


GENITOURINARY: Denies any burning micturition, frequency, or urgency. 


MUSCULOSKELETAL/RHEUMATOLOGICAL: Denies any joint pain, swelling, or any muscle 

pain. 


ENDOCRINE: Denies any polyuria or polydipsia.











Past Medical History


Past Medical History: Atrial Fibrillation, Cancer, GERD/Reflux, Osteoarthritis 

(OA)


Additional Past Medical History / Comment(s): Possible hepatitis B years ago, 

UTIs, cardiac murmur, scoliosis,  DJD, cervical pain d/t position, ocular migr

aines, occasional numbness/tingling to toes bilateral feet, hiatal hernia, 

rectal prolapse, bile duct carncinoma


History of Any Multi-Drug Resistant Organisms: None Reported


Past Surgical History: Appendectomy, Breast Surgery, Hernia Repair, Hysterectomy


Additional Past Surgical History / Comment(s): Umbilical hernia repair, EGD, 

colonoscopy, bilateral cataract removals/lens implants, L breast benign biopsy, 

ercp


Past Anesthesia/Blood Transfusion Reactions: Family History of Problems w/ 

Anesthesia, Postoperative Nausea & Vomiting (PONV)


Additional Past Anesthesia/Blood Transfusion Reaction / Comment(s): Pt states 

her mother's heart stopped during gallbladder surgery.


Past Psychological History: No Psychological Hx Reported


Additional Psychological History / Comment(s): Pt resides alone.  She is 

indpendent.


Smoking Status: Never smoker


Past Alcohol Use History: None Reported


Past Drug Use History: None Reported





- Past Family History


  ** Mother


Family Medical History: Coronary Artery Disease (CAD), CVA/TIA


Additional Family Medical History / Comment(s): Mother had cardiac valve 

disease.  She  from a postop CVA.





  ** Father


Additional Family Medical History / Comment(s): Father had myasthenia gravis.





Medications and Allergies


                                Home Medications











 Medication  Instructions  Recorded  Confirmed  Type


 


Apixaban [Eliquis] 5 mg PO BID #60 tab 17 Rx


 


Pantoprazole Sodium [Protonix] 40 mg PO DAILY 19 History


 


Metoprolol Tartrate [Lopressor] 12.5 mg PO BID 19 History


 


Acetaminophen [Tylenol] 500 mg PO Q8H PRN 20 History


 


Docusate [Colace] 100 mg PO BID PRN 20 History


 


Multivitamins, Thera [Multivitamin 1 tab PO DAILY 20 History





(formulary)]    


 


Polyethylene Glycol 3350 [Miralax] 17 gm PO DAILY PRN 20 History








                                    Allergies











Allergy/AdvReac Type Severity Reaction Status Date / Time


 


adhesive tape Allergy  Unknown Verified 20 08:21


 


cefuroxime Allergy  Itching Verified 20 08:21


 


levofloxacin [From Levaquin] Allergy  Rash/Hives Verified 20 08:21


 


Additives in inhalers AdvReac  Rapid Uncoded 20 01:08





   Heart Rate  














Physical Exam


Vitals: 


                                   Vital Signs











  Temp Pulse Pulse Resp BP BP Pulse Ox


 


 20 04:43  98 F   83  18   136/66  96


 


 20 03:33  98.5 F  83   16  140/59   97


 


 20 01:03  98.1 F  108 H   16  139/74   100








                                Intake and Output











 20





 22:59 06:59 14:59


 


Intake Total  540 


 


Balance  540 


 


Intake:   


 


  Intake, IV Titration  300 





  Amount   


 


    Sodium Chloride 0.9% 1,  200 





    000 ml @ 100 mls/hr IV .   





    Q10H PARISA Rx#:[id number]   


 


    metroNIDAZOLE-NS   100 





    mg In Saline 1 100ml.bag   





    @ 100 mls/hr IVPB Q8H PARISA   





    Rx#:[id number]   


 


  Oral  240 


 


Other:   


 


  Weight  61.507 kg 














GENERAL: The patient is alert and oriented x3, not in any acute distress. Well 

developed, well nourished. 


HEENT: Pupils are round and equally reacting to light. EOMI. No scleral icterus.

No conjunctival pallor. Normocephalic, atraumatic. No pharyngeal erythema. No 

thyromegaly. 


CARDIOVASCULAR: S1 and S2 present. No murmurs, rubs, or gallops. 


PULMONARY: Chest is clear to auscultation, no wheezing or crackles. 


-ABDOMEN: Soft, left lower quadrant tenderness, no guarding or rebound 

tenderness, nondistended, normoactive bowel sounds. No palpable organomegaly. 


MUSCULOSKELETAL: No joint swelling or deformity. 


EXTREMITIES: No cyanosis, clubbing, or pedal edema. 


NEUROLOGICAL: Gross neurological examination did not reveal any focal deficits. 


SKIN: No rashes. No petechiae








Results


CBC & Chem 7: 


                                 20 01:18





                                 20 01:18


Labs: 


                  Abnormal Lab Results - Last 24 Hours (Table)











  20 Range/Units





  01:18 01:18 


 


Glucose   127 H  (74-99)  mg/dL


 


AST   60 H  (14-36)  U/L


 


ALT   59 H  (4-34)  U/L


 


Alkaline Phosphatase   323 H  ()  U/L


 


Stool Occult Blood  Positive H   (Negative)  














Thrombosis Risk Factor Assmnt





- Choose All That Apply


Other Risk Factors: Yes


Each Risk Factor Represents 2 Points: Malignancy


Each Risk Factor Represents 3 Points: Age 75 years or older


Thrombosis Risk Factor Assessment Total Risk Factor Score: 5


Thrombosis Risk Factor Assessment Level: High Risk





Assessment and Plan


Assessment: 





descending colitis, with positive occult blood in stool and rectal blood right 

per rectum


Asymptomatic Markedly dilated gallbladder with mildly dilated biliary tree and 

mildly elevated liver enzymes


Atrial fibrillation on Eliquis


GERD


History of bile duct carcinoma, status post biliary stent, status post 

radiotherapy on January of this year


GERD history hepatitis B


Chronic cervical pain


Occasional numbness in bilateral feet


Hiatal hernia


History of rectal prolapse




















Plan: 





This is a pleasant 49 years old female who presents with possible descending 

colitis and dilated gallbladder.  Occult blood was positive.  GI service 

consult.  Continue with Flagyl and add cephalosporin.  Sent stool for C. diff 

and culture and sensitivity.  Continue with Protonix and DC Pepcid.  Continue 

with IV hydration.  Pain management.  Check liver ultrasound.  Hold Eliquis and 

a few of blood per rectum, Eliquis can be restarted later if his hemoglobin is 

more stable


Labs and medication were reviewed..  Continue same treatment.  Continue with 

symptomatic treatment.  Resume home medication.  Monitor lytes and vitals.  DVT 

and GI prophylaxis.  Further recommendations of the clinical course of the 

patient


DVT prophylaxis: No anticoagulation in view of possible GI bleed.  Mechanical


GI Prophylaxis: Ppi

## 2020-05-13 NOTE — CONS
CONSULTATION



DATE OF SERVICE:

05/13/2020.



REASON FOR CONSULTATION:

Abdominal pain, diarrhea and rectal bleeding.



HISTORY OF PRESENT ILLNESS:

Patient is an 79-year-old pleasant white female who was admitted to the hospital

yesterday when she presented to the emergency room for chronic abdominal pain for the

last 2 weeks' duration.  However, yesterday the pain progressively got worse, more in

the left upper quadrant and left lower quadrant area associated with severe diarrhea.

She had 3 loose watery bowel movements followed by one bloody bowel movement and got

extremely weak and came to the emergency room and by that time, she was somewhat

lightheaded and dizzy.  In the ER, she had a CT of the abdomen and pelvis done that

showed thickening of the descending colon and distal transverse colon consistent with

colitis.  The patient was admitted the hospital and presently on broad-spectrum

antibiotics.  This morning, her abdominal pain is better.  She did not have any further

episodes of bleeding.  In fact she had no bowel movements.



She was diagnosed with cholangiocarcinoma in September of 2019 and followed at

[facility].  She underwent ERCP with metal stent placement following which

she underwent radiation therapy that ended in January of this year.  She was scheduled

for an MRCP in March, but because of the pandemic, that has been postponed.



She denies any fever, chills, night sweats.  She never had these symptoms in the past.

Her last colonoscopy was approximately 10 years ago.



PAST MEDICAL HISTORY:

Significant for cholangiocarcinoma diagnosed in September of 2019 and followed at the [facility], history of atrial fibrillation, gastroesophageal reflux disease, degenerative

joint disease, rectal prolapse.



PAST SURGICAL HISTORY:

Hernia repair, breast surgery, appendectomy, hysterectomy, ERCP with metal stent

placement, EGD and colonoscopy in the past, umbilical hernia repair and bilateral

cataract surgery.



MEDICATIONS:

At home include Eliquis, Protonix, Lopressor, Tylenol, Colace, MiraLAX.



ALLERGIES:

To CEFUROXIME, LEVAQUIN, and INHALERS.



FAMILY HISTORY:

Mother had coronary artery disease, father had myasthenia gravis.



SOCIAL HISTORY:

No smoking or alcohol use.



REVIEW OF SYSTEMS:

CARDIOPULMONARY:  No chest pain or shortness of breath.

GENITOURINARY:  No dysuria or hematuria.

MUSCULOSKELETAL:  Some back pain.

NEUROLOGY:  Unremarkable.

PSYCHIATRIC:  Unremarkable.

ENT/VISION:  Unremarkable.

CONSTITUTIONAL:  She has lost about 10 pounds.  No fever, chills, night sweats.

ENT:VISION:  Unremarkable.

ENDOCRINE: Unremarkable.



PHYSICAL EXAMINATION:

She appears comfortable, in no apparent distress.  Vital signs are stable. Blood

pressure is 136/66, pulse rate 80, temperature 98.

HEENT:  Examination unremarkable, conjunctivae are pink, sclerae nonicteric, oral

cavity no lesions.

NECK:  No JVD or lymph node enlargement.

CHEST:  Clear to auscultation.

HEART:  Regular rate and rhythm.

ABDOMEN:  Soft, there was tenderness in the left lower quadrant area, mild tenderness.

The rest of the abdomen was benign.  Bowel sounds are positive.  No organomegaly.

EXTREMITIES:  No pedal edema.

SKIN:  No rashes.

NEUROLOGIC:  Alert and oriented x3.  No focal deficits.



LABS:

WBC 8.5, hemoglobin 13.2, platelets normal.  Basic metabolic panel is within normal

limits.  Stool for occult blood was positive.  AST and ALT 60 and 59 respectively.

Alkaline phosphatase 323, bilirubin is normal.  CT of the abdomen and pelvis done in

the emergency room yesterday did show thickening of the colon in the descending colon

to the splenic flexure consistent with acute colitis.  There was a metal stent noted.

There was a biliary stent noted. There was mild dilation of the biliary tree and marked

dilation of the gallbladder.



IMPRESSION:

1. This is a lady who presents with acute onset of severe lower abdominal pain

    followed by diarrhea and bloody bowel movements, all consistent with acute ischemic

    colitis.  Possibility of infectious colitis cannot be excluded.  CT of the abdomen

    showed thickening of the descending colon up to the splenic flexure.  Presently on

    empiric antibiotics.  Clinically she is doing much better.  No further episodes of

    bleeding.

2. History of cholangiocarcinoma diagnosed in September of 2019, followed at

    [facility], status post ERCP with a metal stent placement and underwent

    radiation therapy in January of this year.

3. Atrial fibrillation, on Eliquis which is currently on hold.



RECOMMENDATIONS:

1. Continue with empiric antibiotics with Levaquin and Flagyl.

2. Continue with a clear liquid diet today.

3. Pain medications as needed.

4. Hold Eliquis for now.

5. If her symptoms improve, she can _____ as tolerated tomorrow and she can be

    discharged home in the next 1-2 days.

6. No plans for any endoscopy intervention at the present time.  Will follow with you

    closely.

Thank you for this consultation.





MMODL / IJN: [id number] / Job#: [id number]

## 2020-05-13 NOTE — ED
GI Bleed HPI





- General


Source: patient, RN notes reviewed


Mode of arrival: ambulatory


Limitations: no limitations





<Daniel Moser - Last Filed: 20 01:21>





<Kain Jones - Last Filed: 20 19:28>





- General


Chief complaint: GI Bleed


Stated complaint: GI bleed


Time Seen by Provider: 20 01:09





- History of Present Illness


Initial comments: 





This an 79-year-old female presents emergency Department chief complaint of 

rectal bleeding.  Patient states started over the day or so.  Patient states 

that she's had a large amount of bright red blood per rectum with almost.  She 

states it felt the toilet.  She does admit that she is on Eliquis for history of

A. fib.  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 

received radiation in January on no current treatment.  Patient has seen by 

[facility] oncology.  Patient does have a stent in place.  Patient 

denies any current nausea vomiting she has ongoing chills which is normal for 

her.  No recent fever no chest pain or shortness of breath.  Patient has no 

dysuria no hematuria (Daniel Moser)





- Related Data


                                Home Medications











 Medication  Instructions  Recorded  Confirmed


 


Pantoprazole Sodium [Protonix] 40 mg PO DAILY 19


 


Metoprolol Tartrate [Lopressor] 12.5 mg PO BID 19


 


Acetaminophen [Tylenol] 500 mg PO Q8H PRN 20


 


Multivitamins, Thera [Multivitamin 1 tab PO DAILY 20





(formulary)]   


 


Polyethylene Glycol 3350 [Miralax] 17 gm PO DAILY PRN 20








                                  Previous Rx's











 Medication  Instructions  Recorded


 


Apixaban [Eliquis] 5 mg PO BID #60 tab 17


 


Amoxicillin/Potassium Clav 1 tab PO Q12HR #14 tab 20





[Augmentin 875-125 Tablet]  


 


Dicyclomine [Bentyl] 10 mg PO QID PRN #20 cap 20


 


metroNIDAZOLE [Flagyl] 250 mg PO Q8H #21 tab 20











                                    Allergies











Allergy/AdvReac Type Severity Reaction Status Date / Time


 


adhesive tape Allergy  Unknown Verified 20 08:21


 


cefuroxime Allergy  Itching Verified 20 08:21


 


levofloxacin [From Levaquin] Allergy  Rash/Hives Verified 20 08:21


 


Additives in inhalers AdvReac  Rapid Uncoded 20 01:08





   Heart Rate  














Review of Systems


ROS Other: All systems not noted in ROS Statement are negative.





<Daniel Moser - Last Filed: 20 01:21>


ROS Other: All systems not noted in ROS Statement are negative.





<Kain Jones - Last Filed: 20 19:28>


ROS Statement: 


Those systems with pertinent positive or pertinent negative responses have been 

documented in the HPI.








Past Medical History


Past Medical History: Atrial Fibrillation, Cancer, GERD/Reflux, Osteoarthritis 

(OA)


Additional Past Medical History / Comment(s): Possible hepatitis B years ago, 

UTIs, cardiac murmur, scoliosis,  DJD, cervical pain d/t position, ocular 

migraines, occasional numbness/tingling to toes bilateral feet, hiatal hernia, 

rectal prolapse, bile duct carncinoma


History of Any Multi-Drug Resistant Organisms: None Reported


Past Surgical History: Appendectomy, Breast Surgery, Hernia Repair, Hysterectomy


Additional Past Surgical History / Comment(s): Umbilical hernia repair, EGD, 

colonoscopy, bilateral cataract removals/lens implants, L breast benign biopsy, 

ercp


Past Anesthesia/Blood Transfusion Reactions: Family History of Problems w/ 

Anesthesia, Postoperative Nausea & Vomiting (PONV)


Additional Past Anesthesia/Blood Transfusion Reaction / Comment(s): Pt states 

her mother's heart stopped during gallbladder surgery.


Past Psychological History: No Psychological Hx Reported


Smoking Status: Never smoker


Past Alcohol Use History: None Reported


Past Drug Use History: None Reported





- Past Family History


  ** Mother


Family Medical History: Coronary Artery Disease (CAD), CVA/TIA


Additional Family Medical History / Comment(s): Mother had cardiac valve 

disease.  She  from a postop CVA.





  ** Father


Additional Family Medical History / Comment(s): Father had myasthenia gravis.





<Daniel Moser - Last Filed: 20 01:21>





General Exam


Limitations: no limitations


General appearance: alert, in no apparent distress


Head exam: Present: atraumatic, normocephalic, normal inspection


Eye exam: Present: normal appearance, PERRL, EOMI.  Absent: scleral icterus, 

conjunctival injection, periorbital swelling


Neck exam: Present: normal inspection, full ROM.  Absent: tenderness, 

meningismus, lymphadenopathy


Respiratory exam: Present: normal lung sounds bilaterally.  Absent: respiratory 

distress, wheezes, rales, rhonchi, stridor


Cardiovascular Exam: Present: normal rhythm, tachycardia, normal heart sounds.  

Absent: systolic murmur, diastolic murmur, rubs, gallop, clicks


GI/Abdominal exam: Present: soft, tenderness (Moderate lower abdominal 

tenderness), normal bowel sounds.  Absent: distended, guarding, rebound, rigid


Back exam: Absent: CVA tenderness (R), CVA tenderness (L)


Neurological exam: Present: alert, oriented X3


Skin exam: Present: warm, dry, intact, normal color.  Absent: rash





<Daniel Moser - Last Filed: 20 01:21>





Course


                                   Vital Signs











  20





  01:03 03:33


 


Temperature 98.1 F 98.5 F


 


Pulse Rate 108 H 83


 


Respiratory 16 16





Rate  


 


Blood Pressure 139/74 140/59


 


O2 Sat by Pulse 100 97





Oximetry  














Medical Decision Making





- Lab Data


Result diagrams: 


                                 05/15/20 00:57





                                 05/15/20 05:41





<Kain Jones - Last Filed: 20 19:28>





- Medical Decision Making





I saw this patient in conjunction with the physician assistant.  I performed 

independent history and physical exam.  Agree with case management. 

(Kain Jones)





- Lab Data


                                   Lab Results











  20 Range/Units





  01:18 01:18 01:18 


 


WBC  8.5    (3.8-10.6)  k/uL


 


RBC  4.39    (3.80-5.40)  m/uL


 


Hgb  13.2    (11.4-16.0)  gm/dL


 


Hct  42.2    (34.0-46.0)  %


 


MCV  96.3    (80.0-100.0)  fL


 


MCH  30.1    (25.0-35.0)  pg


 


MCHC  31.3    (31.0-37.0)  g/dL


 


RDW  13.7    (11.5-15.5)  %


 


Plt Count  212    (150-450)  k/uL


 


Neutrophils %  74    %


 


Lymphocytes %  12    %


 


Monocytes %  8    %


 


Eosinophils %  2    %


 


Basophils %  0    %


 


Neutrophils #  6.3    (1.3-7.7)  k/uL


 


Lymphocytes #  1.0    (1.0-4.8)  k/uL


 


Monocytes #  0.6    (0-1.0)  k/uL


 


Eosinophils #  0.2    (0-0.7)  k/uL


 


Basophils #  0.0    (0-0.2)  k/uL


 


PT    10.5  (9.0-12.0)  sec


 


INR    1.0  (<1.2)  


 


APTT    25.1  (22.0-30.0)  sec


 


Sodium     (137-145)  mmol/L


 


Potassium     (3.5-5.1)  mmol/L


 


Chloride     ()  mmol/L


 


Carbon Dioxide     (22-30)  mmol/L


 


Anion Gap     mmol/L


 


BUN     (7-17)  mg/dL


 


Creatinine     (0.52-1.04)  mg/dL


 


Est GFR (CKD-EPI)AfAm     (>60 ml/min/1.73 sqM)  


 


Est GFR (CKD-EPI)NonAf     (>60 ml/min/1.73 sqM)  


 


Glucose     (74-99)  mg/dL


 


Plasma Lactic Acid Benja     (0.7-2.0)  mmol/L


 


Calcium     (8.4-10.2)  mg/dL


 


Magnesium     (1.6-2.3)  mg/dL


 


Total Bilirubin     (0.2-1.3)  mg/dL


 


AST     (14-36)  U/L


 


ALT     (4-34)  U/L


 


Alkaline Phosphatase     ()  U/L


 


Troponin I     (0.000-0.034)  ng/mL


 


Total Protein     (6.3-8.2)  g/dL


 


Albumin     (3.5-5.0)  g/dL


 


Lipase     ()  U/L


 


Stool Occult Blood   Positive H   (Negative)  














  20 Range/Units





  01:18 01:18 01:18 


 


WBC     (3.8-10.6)  k/uL


 


RBC     (3.80-5.40)  m/uL


 


Hgb     (11.4-16.0)  gm/dL


 


Hct     (34.0-46.0)  %


 


MCV     (80.0-100.0)  fL


 


MCH     (25.0-35.0)  pg


 


MCHC     (31.0-37.0)  g/dL


 


RDW     (11.5-15.5)  %


 


Plt Count     (150-450)  k/uL


 


Neutrophils %     %


 


Lymphocytes %     %


 


Monocytes %     %


 


Eosinophils %     %


 


Basophils %     %


 


Neutrophils #     (1.3-7.7)  k/uL


 


Lymphocytes #     (1.0-4.8)  k/uL


 


Monocytes #     (0-1.0)  k/uL


 


Eosinophils #     (0-0.7)  k/uL


 


Basophils #     (0-0.2)  k/uL


 


PT     (9.0-12.0)  sec


 


INR     (<1.2)  


 


APTT     (22.0-30.0)  sec


 


Sodium  137    (137-145)  mmol/L


 


Potassium  3.9    (3.5-5.1)  mmol/L


 


Chloride  105    ()  mmol/L


 


Carbon Dioxide  25    (22-30)  mmol/L


 


Anion Gap  7    mmol/L


 


BUN  13    (7-17)  mg/dL


 


Creatinine  0.65    (0.52-1.04)  mg/dL


 


Est GFR (CKD-EPI)AfAm  >90    (>60 ml/min/1.73 sqM)  


 


Est GFR (CKD-EPI)NonAf  84    (>60 ml/min/1.73 sqM)  


 


Glucose  127 H    (74-99)  mg/dL


 


Plasma Lactic Acid Benja   1.0   (0.7-2.0)  mmol/L


 


Calcium  9.1    (8.4-10.2)  mg/dL


 


Magnesium  1.8    (1.6-2.3)  mg/dL


 


Total Bilirubin  0.6    (0.2-1.3)  mg/dL


 


AST  60 H    (14-36)  U/L


 


ALT  59 H    (4-34)  U/L


 


Alkaline Phosphatase  323 H    ()  U/L


 


Troponin I    <0.012  (0.000-0.034)  ng/mL


 


Total Protein  6.7    (6.3-8.2)  g/dL


 


Albumin  3.7    (3.5-5.0)  g/dL


 


Lipase  83    ()  U/L


 


Stool Occult Blood     (Negative)  














Disposition





<Daniel Moser - Last Filed: 20 01:21>





<Kain Jones - Last Filed: 20 19:28>


Clinical Impression: 


 Lower gastrointestinal hemorrhage, Colitis, Abdominal pain





Disposition: ADMITTED AS IP TO THIS HOSP


Condition: Stable

## 2020-05-13 NOTE — CT
EXAMINATION TYPE: CT abdomen pelvis w con

 

DATE OF EXAM: 5/13/2020

 

COMPARISON: 11/3/2019

 

HISTORY: gi bleed and abdominal pain.

 

CT DLP: 600.2 mGycm

Automated exposure control for dose reduction was used.

 

CONTRAST: 

Performed with IV Contrast, patient injected with 100mL mL of Isovue 300.

 

There is mild scarring and subsegmental atelectasis at the lung bases. Heart appears slightly enlarge
d. There is no pericardial effusion. There is no pleural effusion.

 

There is biliary stent. There is mild dilation of the biliary tree. There is markedly dilated gallbla
dder that measures 5.8 cm in diameter. Spleen appears normal. There is no pancreatic mass.

 

There is no adrenal mass. Kidneys show satisfactory contrast opacification. There is no hydronephrosi
s. Ureters are not dilated. There is no retroperitoneal adenopathy. Bladder distends smoothly. There 
are phleboliths in the pelvis. There is hysterectomy. There is no inguinal hernia.

 

There is no free air. There is no ascites. There is no sign of a bowel obstruction.

 

There is wall thickening of the splenic flexure of the colon extending into the descending colon. The
 cecum appears normal. Appendix is not seen. There is no sign of thickened appendix. The lumbar spine
 is intact. There is mild lumbar levoscoliosis. The bony pelvis is intact. The hip joints are intact.


 

IMPRESSION:

Dilated biliary tree and markedly dilated gallbladder is similar to old exam and suggestive of biliar
y stent malfunction.

 

There is diffuse wall thickening of the descending colon and splenic flexure of the colon that is a c
hange compared to old exam and suggestive of nonspecific colitis.

## 2020-05-13 NOTE — US
EXAMINATION TYPE: US liver

 

DATE OF EXAM: 5/13/2020

 

COMPARISON: CT dated 5/13/2020

 

CLINICAL HISTORY: Dilated gallbladder. history of bile duct carcinoma, biliary stent 10/19

 

EXAM MEASUREMENTS:

 

Liver Length:  14.1 cm   

Gallbladder Wall:  0.2 cm   

Right Kidney:  9.4 x 5.4 x 4.0 cm

 

**Technical limitations due to large amount of overlying bowel content 

 

Pancreas:  Obscured by bowel gas

Liver:  best seen intercostally, left lobe is atrophic, there is some biliary ductal dilatation prese
nt. Biliary stent is noted as on CT.

Gallbladder:  hydropic as noted on patient's CT

**Evidence for sonographic Light's sign:  no

CBD:  limited evaluation, biliary stenting visualized  

Right Kidney:  no evidence of hydronephrosis, cortical medullary differentiation is maintained  

 

There is no ascites.

 

IMPRESSION: Exam is limited. Hydropic gallbladder, biliary stent noted as on patient's CT, there is b
iliary dilatation, there may be a biliary stent malfunction.

## 2020-05-14 LAB
ALBUMIN SERPL-MCNC: 2.6 G/DL (ref 3.5–5)
ALP SERPL-CCNC: 227 U/L (ref 38–126)
ALT SERPL-CCNC: 44 U/L (ref 4–34)
ANION GAP SERPL CALC-SCNC: 3 MMOL/L
AST SERPL-CCNC: 49 U/L (ref 14–36)
BASOPHILS # BLD AUTO: 0 K/UL (ref 0–0.2)
BASOPHILS NFR BLD AUTO: 0 %
BILIRUB INDIRECT SERPL-MCNC: 0.9 MG/DL (ref 0–1.1)
BILIRUBIN DIRECT+TOT PNL SERPL-MCNC: 0 MG/DL (ref 0–0.2)
BUN SERPL-SCNC: 7 MG/DL (ref 7–17)
CALCIUM SPEC-MCNC: 8 MG/DL (ref 8.4–10.2)
CHLORIDE SERPL-SCNC: 110 MMOL/L (ref 98–107)
CO2 SERPL-SCNC: 25 MMOL/L (ref 22–30)
EOSINOPHIL # BLD AUTO: 0.1 K/UL (ref 0–0.7)
EOSINOPHIL # BLD AUTO: 0.1 K/UL (ref 0–0.7)
EOSINOPHIL # BLD AUTO: 0.2 K/UL (ref 0–0.7)
EOSINOPHIL NFR BLD AUTO: 1 %
EOSINOPHIL NFR BLD AUTO: 2 %
EOSINOPHIL NFR BLD AUTO: 3 %
ERYTHROCYTE [DISTWIDTH] IN BLOOD BY AUTOMATED COUNT: 3.67 M/UL (ref 3.8–5.4)
ERYTHROCYTE [DISTWIDTH] IN BLOOD BY AUTOMATED COUNT: 3.79 M/UL (ref 3.8–5.4)
ERYTHROCYTE [DISTWIDTH] IN BLOOD BY AUTOMATED COUNT: 4.04 M/UL (ref 3.8–5.4)
ERYTHROCYTE [DISTWIDTH] IN BLOOD: 13.9 % (ref 11.5–15.5)
ERYTHROCYTE [DISTWIDTH] IN BLOOD: 14 % (ref 11.5–15.5)
ERYTHROCYTE [DISTWIDTH] IN BLOOD: 14 % (ref 11.5–15.5)
GLUCOSE SERPL-MCNC: 86 MG/DL (ref 74–99)
HCT VFR BLD AUTO: 36.1 % (ref 34–46)
HCT VFR BLD AUTO: 36.9 % (ref 34–46)
HCT VFR BLD AUTO: 38.5 % (ref 34–46)
HGB BLD-MCNC: 11.5 GM/DL (ref 11.4–16)
HGB BLD-MCNC: 12.1 GM/DL (ref 11.4–16)
HGB BLD-MCNC: 12.9 GM/DL (ref 11.4–16)
LYMPHOCYTES # SPEC AUTO: 0.5 K/UL (ref 1–4.8)
LYMPHOCYTES # SPEC AUTO: 0.6 K/UL (ref 1–4.8)
LYMPHOCYTES # SPEC AUTO: 0.7 K/UL (ref 1–4.8)
LYMPHOCYTES NFR SPEC AUTO: 5 %
LYMPHOCYTES NFR SPEC AUTO: 9 %
LYMPHOCYTES NFR SPEC AUTO: 9 %
MCH RBC QN AUTO: 31.2 PG (ref 25–35)
MCH RBC QN AUTO: 32 PG (ref 25–35)
MCH RBC QN AUTO: 32 PG (ref 25–35)
MCHC RBC AUTO-ENTMCNC: 31.8 G/DL (ref 31–37)
MCHC RBC AUTO-ENTMCNC: 32.8 G/DL (ref 31–37)
MCHC RBC AUTO-ENTMCNC: 33.6 G/DL (ref 31–37)
MCV RBC AUTO: 95.4 FL (ref 80–100)
MCV RBC AUTO: 97.4 FL (ref 80–100)
MCV RBC AUTO: 98.3 FL (ref 80–100)
MONOCYTES # BLD AUTO: 0.4 K/UL (ref 0–1)
MONOCYTES # BLD AUTO: 0.4 K/UL (ref 0–1)
MONOCYTES # BLD AUTO: 0.5 K/UL (ref 0–1)
MONOCYTES NFR BLD AUTO: 4 %
MONOCYTES NFR BLD AUTO: 7 %
MONOCYTES NFR BLD AUTO: 7 %
NEUTROPHILS # BLD AUTO: 4.7 K/UL (ref 1.3–7.7)
NEUTROPHILS # BLD AUTO: 5.8 K/UL (ref 1.3–7.7)
NEUTROPHILS # BLD AUTO: 8.4 K/UL (ref 1.3–7.7)
NEUTROPHILS NFR BLD AUTO: 78 %
NEUTROPHILS NFR BLD AUTO: 80 %
NEUTROPHILS NFR BLD AUTO: 90 %
PLATELET # BLD AUTO: 151 K/UL (ref 150–450)
PLATELET # BLD AUTO: 185 K/UL (ref 150–450)
PLATELET # BLD AUTO: 192 K/UL (ref 150–450)
POTASSIUM SERPL-SCNC: 3.6 MMOL/L (ref 3.5–5.1)
PROT SERPL-MCNC: 5.3 G/DL (ref 6.3–8.2)
SODIUM SERPL-SCNC: 138 MMOL/L (ref 137–145)
WBC # BLD AUTO: 6.1 K/UL (ref 3.8–10.6)
WBC # BLD AUTO: 7.3 K/UL (ref 3.8–10.6)
WBC # BLD AUTO: 9.4 K/UL (ref 3.8–10.6)

## 2020-05-14 RX ADMIN — ONDANSETRON PRN MG: 2 INJECTION INTRAMUSCULAR; INTRAVENOUS at 06:19

## 2020-05-14 RX ADMIN — METOPROLOL TARTRATE SCH MG: 25 TABLET, FILM COATED ORAL at 11:18

## 2020-05-14 RX ADMIN — CEFAZOLIN SCH: 330 INJECTION, POWDER, FOR SOLUTION INTRAMUSCULAR; INTRAVENOUS at 11:19

## 2020-05-14 RX ADMIN — PIPERACILLIN AND TAZOBACTAM SCH MLS/HR: 3; .375 INJECTION, POWDER, FOR SOLUTION INTRAVENOUS at 20:10

## 2020-05-14 RX ADMIN — METOPROLOL TARTRATE SCH MG: 25 TABLET, FILM COATED ORAL at 20:09

## 2020-05-14 RX ADMIN — POTASSIUM CHLORIDE SCH MLS/HR: 14.9 INJECTION, SOLUTION INTRAVENOUS at 11:17

## 2020-05-14 RX ADMIN — CEFDINIR SCH: 300 CAPSULE ORAL at 11:22

## 2020-05-14 RX ADMIN — METRONIDAZOLE SCH MLS/HR: 500 INJECTION, SOLUTION INTRAVENOUS at 20:11

## 2020-05-14 RX ADMIN — PIPERACILLIN AND TAZOBACTAM SCH MLS/HR: 3; .375 INJECTION, POWDER, FOR SOLUTION INTRAVENOUS at 12:46

## 2020-05-14 RX ADMIN — PANTOPRAZOLE SODIUM SCH MG: 40 TABLET, DELAYED RELEASE ORAL at 11:18

## 2020-05-14 RX ADMIN — METRONIDAZOLE SCH MLS/HR: 500 INJECTION, SOLUTION INTRAVENOUS at 11:17

## 2020-05-14 RX ADMIN — METRONIDAZOLE SCH MLS/HR: 500 INJECTION, SOLUTION INTRAVENOUS at 04:42

## 2020-05-14 RX ADMIN — MORPHINE SULFATE PRN MG: 4 INJECTION, SOLUTION INTRAMUSCULAR; INTRAVENOUS at 02:10

## 2020-05-14 NOTE — P.PN
Progress Note - Text


Progress Note Date: 05/14/20





Presenting complaint:


Abdominal pain and diarrhea





History of presenting complaint:


This is a pleasant 79 years old female with past medical history of atrial 

fibrillation on Eliquis, osteoarthritis, GERD, possible hepatitis B, cervical 

pain, occasional numbness in bilateral feet hiatal hernia, rectal prolapse, bile

duct carcinoma.  Presents because of abdominal pain and diarrhea.  Patient 

started having abdominal pain for about one week within THE tendon progressively

worse, and the left lower quadrant, non-radiating, felt like achy/colicky, about

8/10 in severity on the presentation associated with nausea but no vomiting.  

Patient has also diarrhea for example yesterday she had 11-12 bouts of bowel 

movements, they were loose and watery with little blood and that she had 1 large

bloody bowel movement associated with lightheadedness and decided to come to the

hospital


Patient denies fever or chills, no coughing, no chest pain or dyspnea.


She's the patient's of Dr. Montoya, she follows up at [facility] for

her radiotherapy to her bile duct cancer, her stents was placed also at 

[facility].  She follows up with Dr. Murry for her A. fib.


She denies smoking alcohol or illicit drugs


Admitted with-acute descending colitis.  Patient was on Flagyl and Omnicef.


Today-patient having nausea.  Slight improvement in the abdominal pain.  No 

bowel movement this morning.patient will does feel weak tired rundown





Review of systems: Was done for constitutional, cardiovascular, GI, pulmonary. 

relevant finding as above





Active Medications





Acetaminophen (Tylenol Tab)  650 mg PO Q6HR PRN


   PRN Reason: Mild Pain or Fever > 100.5


Al Hydroxide/Mg Hydroxide (Maalox)  15 ml PO Q6HR PRN


   PRN Reason: Indigestion


Dicyclomine HCl (Bentyl)  10 mg PO QID PRN


   PRN Reason: Dyspepsia


Docusate Sodium (Colace)  100 mg PO BID PRN


   PRN Reason: Constipation


Metronidazole 500 mg/ IV (Solution)  100 mls @ 100 mls/hr IVPB Q8H [facility]


   Last Admin: 05/14/20 11:17 Dose:  100 mls/hr


   Documented by: 


Lactated Ringer's (Lactated Ringers)  1,000 mls @ 125 mls/hr IV .Q8H [facility]


   Last Admin: 05/14/20 11:17 Dose:  125 mls/hr


   Documented by: 


Piperacillin Sod/Tazobactam (Sod 3.375 gm/ Sodium Chloride)  100 mls @ 25 mls/hr

IVPB Q8H [facility]


   Last Admin: 05/14/20 12:46 Dose:  25 mls/hr


   Documented by: 


Metoprolol Tartrate (Lopressor)  12.5 mg PO BID [facility]


   Last Admin: 05/14/20 11:18 Dose:  12.5 mg


   Documented by: 


Morphine Sulfate (Morphine Sulfate (Inj))  4 mg IV Q4HR PRN


   PRN Reason: Severe Pain


   Last Admin: 05/14/20 02:10 Dose:  4 mg


   Documented by: 


Naloxone HCl (Narcan)  0.2 mg IV Q2M PRN


   PRN Reason: Opioid Reversal


Ondansetron HCl (Zofran)  4 mg IVP Q8HR PRN


   PRN Reason: Nausea And Vomiting


   Last Admin: 05/14/20 06:19 Dose:  4 mg


   Documented by: 


Pantoprazole Sodium (Protonix)  40 mg PO DAILY [facility]


   Last Admin: 05/14/20 11:18 Dose:  40 mg


   Documented by: 





On examination:


VITAL SIGNS: [97.8, 83, 17, 128 was 35, 97% on room air]


GENERAL APPEARANCE: sitting up in bed, but tired. 


HEENT: Normal external appearance of nose and ear.  Oral cavity normal


EYES: Pupils equal. Conjunctiva normal. 


NECK: JVD not raised. Mass not palpable. 


RESPIRATORY: Respiratory effort normal. Lungs clear to auscultation. 


CARDIOVASCULAR: First and second sounds normal. No edema. 


ABDOMEN: Soft. tenderness, no guarding or rigidity, pulse is presentLiver and 

spleen not palpable. No tenderness. No mass palpable. 


PSYCHIATRY: Alert and oriented x3. Mood and affect normal. 





INVESTIGATIONS, reviewed in the clinical context:


white count 6.1 hemoglobin 11.5 potassium 3.6 creatinine 0.54


AST 49 ALT 44 albumin 2.6





Previous testing:


white count 8.5


Liver ultrasound-left lobe is atrophic, some biliary ductal dilation present.  

biliary stent noted.hydropic gallbladder-suggestion of bili stent malfunction


CT abdomen and pelvis with contrast-dilated biliary tree with markedly dilated 

gallbladder suggestive of bile stent malfunction.  Wall thickening of the 

splenic flexure of the colon extending into the descending colon.  Cecum appears

to be normal.





Assessment:


-Acute descending colon colitis possibly ischemic


-Hydropic gallbladder with the possibility of bili stent malfunction


-Persistent atrial fibrillation


-GERD


-Family osteoarthritis


-Scoliosis


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 

followed by radiation treatment.





Plan


Patient had been on IV Flagyl and Omnicef.  change over to IV Zosyn.  Other 

medications to continue.  Also IV fluids of increased.:patient had been on a 

clear liquid diet.diet has been advanced by surgery to full liquid diet.  

Encouraged to sit up in a chair.

## 2020-05-15 LAB
ALBUMIN SERPL-MCNC: 2.8 G/DL (ref 3.5–5)
ALP SERPL-CCNC: 223 U/L (ref 38–126)
ALT SERPL-CCNC: 38 U/L (ref 4–34)
ANION GAP SERPL CALC-SCNC: 6 MMOL/L
AST SERPL-CCNC: 56 U/L (ref 14–36)
BASOPHILS # BLD AUTO: 0 K/UL (ref 0–0.2)
BASOPHILS NFR BLD AUTO: 0 %
BUN SERPL-SCNC: 7 MG/DL (ref 7–17)
CALCIUM SPEC-MCNC: 8.4 MG/DL (ref 8.4–10.2)
CHLORIDE SERPL-SCNC: 105 MMOL/L (ref 98–107)
CO2 SERPL-SCNC: 25 MMOL/L (ref 22–30)
EOSINOPHIL # BLD AUTO: 0.2 K/UL (ref 0–0.7)
EOSINOPHIL NFR BLD AUTO: 3 %
ERYTHROCYTE [DISTWIDTH] IN BLOOD BY AUTOMATED COUNT: 3.75 M/UL (ref 3.8–5.4)
ERYTHROCYTE [DISTWIDTH] IN BLOOD: 14.1 % (ref 11.5–15.5)
GLUCOSE SERPL-MCNC: 82 MG/DL (ref 74–99)
HCT VFR BLD AUTO: 36.4 % (ref 34–46)
HGB BLD-MCNC: 11.8 GM/DL (ref 11.4–16)
LYMPHOCYTES # SPEC AUTO: 0.6 K/UL (ref 1–4.8)
LYMPHOCYTES NFR SPEC AUTO: 7 %
MCH RBC QN AUTO: 31.5 PG (ref 25–35)
MCHC RBC AUTO-ENTMCNC: 32.4 G/DL (ref 31–37)
MCV RBC AUTO: 97 FL (ref 80–100)
MONOCYTES # BLD AUTO: 0.6 K/UL (ref 0–1)
MONOCYTES NFR BLD AUTO: 7 %
NEUTROPHILS # BLD AUTO: 6.6 K/UL (ref 1.3–7.7)
NEUTROPHILS NFR BLD AUTO: 81 %
PLATELET # BLD AUTO: 191 K/UL (ref 150–450)
POTASSIUM SERPL-SCNC: 3.5 MMOL/L (ref 3.5–5.1)
PROT SERPL-MCNC: 5.3 G/DL (ref 6.3–8.2)
SODIUM SERPL-SCNC: 136 MMOL/L (ref 137–145)
WBC # BLD AUTO: 8.1 K/UL (ref 3.8–10.6)

## 2020-05-15 RX ADMIN — POTASSIUM CHLORIDE SCH MLS/HR: 14.9 INJECTION, SOLUTION INTRAVENOUS at 03:26

## 2020-05-15 RX ADMIN — METOPROLOL TARTRATE SCH MG: 25 TABLET, FILM COATED ORAL at 21:13

## 2020-05-15 RX ADMIN — PIPERACILLIN AND TAZOBACTAM SCH MLS/HR: 3; .375 INJECTION, POWDER, FOR SOLUTION INTRAVENOUS at 03:26

## 2020-05-15 RX ADMIN — ONDANSETRON PRN MG: 2 INJECTION INTRAMUSCULAR; INTRAVENOUS at 01:38

## 2020-05-15 RX ADMIN — PANTOPRAZOLE SODIUM SCH MG: 40 TABLET, DELAYED RELEASE ORAL at 08:18

## 2020-05-15 RX ADMIN — METOPROLOL TARTRATE SCH MG: 25 TABLET, FILM COATED ORAL at 08:19

## 2020-05-15 RX ADMIN — PIPERACILLIN AND TAZOBACTAM SCH MLS/HR: 3; .375 INJECTION, POWDER, FOR SOLUTION INTRAVENOUS at 12:58

## 2020-05-15 RX ADMIN — PIPERACILLIN AND TAZOBACTAM SCH MLS/HR: 3; .375 INJECTION, POWDER, FOR SOLUTION INTRAVENOUS at 19:38

## 2020-05-15 RX ADMIN — METRONIDAZOLE SCH MLS/HR: 500 INJECTION, SOLUTION INTRAVENOUS at 19:07

## 2020-05-15 RX ADMIN — METRONIDAZOLE SCH MLS/HR: 500 INJECTION, SOLUTION INTRAVENOUS at 11:59

## 2020-05-15 RX ADMIN — POTASSIUM CHLORIDE SCH: 14.9 INJECTION, SOLUTION INTRAVENOUS at 15:45

## 2020-05-15 RX ADMIN — METRONIDAZOLE SCH MLS/HR: 500 INJECTION, SOLUTION INTRAVENOUS at 03:26

## 2020-05-15 RX ADMIN — POTASSIUM CHLORIDE SCH MLS/HR: 14.9 INJECTION, SOLUTION INTRAVENOUS at 01:40

## 2020-05-15 NOTE — P.PN
Subjective


Progress Note Date: 05/14/20


Principal diagnosis: 





Colitis, cholangiocarcinoma, abdominal pain





Patient is seen lying in bed still reporting some abdominal pain.  She is 

tolerated some liquids.  She did have some nausea and vomiting.





Objective





- Vital Signs


Vital signs: 


                                   Vital Signs











Temp  98.1 F   05/14/20 05:07


 


Pulse  79   05/14/20 05:07


 


Resp  16   05/14/20 05:07


 


BP  104/59   05/14/20 05:07


 


Pulse Ox  98   05/14/20 05:07








                                 Intake & Output











 05/13/20 05/14/20 05/14/20





 18:59 06:59 18:59


 


Intake Total 200  


 


Balance 200  


 


Intake:   


 


  Oral 200  


 


Other:   


 


  # Voids 1 1 














- Exam





On physical examination, patient appears comfortable in no apparent distress. 


HEAD: Normocephalic, atraumatic. 


EYES: No scleral icterus. No conjunctival injection. 


MOUTH: No lesions, tongue midline. 


NECK: Trachea midline, no gross abnormalities. 


CHEST: Clear to auscultation with no wheezing or rhonchi appreciated. 


ABDOMEN: Soft. Bowel sounds are positive. No organomegaly.  No guarding or 

rigidity.


EXTREMITIES: No pedal edema. 


SKIN: No rashes, no jaundice. 


NEUROLOGIC: Alert and oriented x3.  No focal deficits. 





- Labs


CBC & Chem 7: 


                                 05/15/20 00:57





                                 05/15/20 05:41


Labs: 


                  Abnormal Lab Results - Last 24 Hours (Table)











  05/14/20 05/14/20 Range/Units





  06:03 06:03 


 


RBC  3.67 L   (3.80-5.40)  m/uL


 


Lymphocytes #  0.6 L   (1.0-4.8)  k/uL


 


Chloride   110 H  ()  mmol/L


 


Calcium   8.0 L  (8.4-10.2)  mg/dL


 


AST   49 H  (14-36)  U/L


 


ALT   44 H  (4-34)  U/L


 


Alkaline Phosphatase   227 H  ()  U/L


 


Total Protein   5.3 L  (6.3-8.2)  g/dL


 


Albumin   2.6 L  (3.5-5.0)  g/dL














Assessment and Plan


(1) Colitis


Narrative/Plan: 


79-year-old female who presents to the hospital with complaints of severe lower 

abdominal pain followed by diarrhea and bloody bowel movements.  Computed 

tomography scan of the abdomen showed thickening of the descending colon up to 

the splenic flexure with inspiration for ischemic colitis, less likely 

infectious colitis or inflammatory process.  No further episodes of bleeding 

however pain has continued.  Patient has a history of cholangiocarcinoma treated

with ERCP and stent placement as well as radiation therapy at the [facility].


Current Visit: Yes   Status: Acute   Code(s): K52.9 - NONINFECTIVE 

GASTROENTERITIS AND COLITIS, UNSPECIFIED   SNOMED Code(s): 59320108


   





(2) Abdominal pain


Current Visit: Yes   Status: Acute   Code(s): R10.9 - UNSPECIFIED ABDOMINAL PAIN

  SNOMED Code(s): 78909380


   


Plan: 





Supportive care


Okay to advance to full liquid diet


Bentyl added as needed for abdominal pain


Continue broad-spectrum antibiotic therapy


Continue to monitor CBC, BMP, LFTs


Continue to monitor her symptomatically


Thank you for allowing us to despite in the care of the patient we will continue

to follow

## 2020-05-15 NOTE — P.PN
Progress Note - Text


Progress Note Date: 05/15/20





Presenting complaint:


Abdominal pain and diarrhea





History of presenting complaint:


This is a pleasant 79 years old female with past medical history of atrial 

fibrillation on Eliquis, osteoarthritis, GERD, possible hepatitis B, cervical 

pain, occasional numbness in bilateral feet hiatal hernia, rectal prolapse, bile

duct carcinoma.  Presents because of abdominal pain and diarrhea.  Patient 

started having abdominal pain for about one week within THE tendon progressively

worse, and the left lower quadrant, non-radiating, felt like achy/colicky, about

8/10 in severity on the presentation associated with nausea but no vomiting.  

Patient has also diarrhea for example yesterday she had 11-12 bouts of bowel 

movements, they were loose and watery with little blood and that she had 1 large

bloody bowel movement associated with lightheadedness and decided to come to the

hospital


Patient denies fever or chills, no coughing, no chest pain or dyspnea.


She's the patient's of Dr. Montoya, she follows up at [facility] for

her radiotherapy to her bile duct cancer, her stents was placed also at 

[facility].  She follows up with Dr. Murry for her A. fib.


She denies smoking alcohol or illicit drugs


Admitted with-acute descending colitis.  Patient was on Flagyl and Omnicef.


Today-decreasing abdominal pain.  Eating better.  Has been out of bed.





Review of systems: Was done for constitutional, cardiovascular, GI, pulmonary. 

relevant finding as above





Active Medications





Acetaminophen (Tylenol Tab)  650 mg PO Q6HR PRN


   PRN Reason: Mild Pain or Fever > 100.5


Al Hydroxide/Mg Hydroxide (Maalox)  15 ml PO Q6HR PRN


   PRN Reason: Indigestion


Alprazolam (Xanax)  0.25 mg PO Q8H PRN


   PRN Reason: Anxiety


Dicyclomine HCl (Bentyl)  10 mg PO QID PRN


   PRN Reason: Dyspepsia


   Last Admin: 05/15/20 08:18 Dose:  10 mg


   Documented by: 


Docusate Sodium (Colace)  100 mg PO BID PRN


   PRN Reason: Constipation


Metronidazole 500 mg/ IV (Solution)  100 mls @ 100 mls/hr IVPB Q8H [facility]


   Last Admin: 05/15/20 19:07 Dose:  100 mls/hr


   Documented by: 


Lactated Ringer's (Lactated Ringers)  1,000 mls @ 125 mls/hr IV .Q8H [facility]


   Last Admin: 05/15/20 15:45 Dose:  Not Given


   Documented by: 


Piperacillin Sod/Tazobactam (Sod 3.375 gm/ Sodium Chloride)  100 mls @ 25 mls/hr

IVPB Q8H [facility]


   Last Admin: 05/15/20 19:38 Dose:  25 mls/hr


   Documented by: 


Metoprolol Tartrate (Lopressor)  12.5 mg PO BID [facility]


   Last Admin: 05/15/20 08:19 Dose:  12.5 mg


   Documented by: 


Morphine Sulfate (Morphine Sulfate (Inj))  4 mg IV Q4HR PRN


   PRN Reason: Severe Pain


   Last Admin: 05/14/20 02:10 Dose:  4 mg


   Documented by: 


Naloxone HCl (Narcan)  0.2 mg IV Q2M PRN


   PRN Reason: Opioid Reversal


Ondansetron HCl (Zofran)  4 mg IVP Q8HR PRN


   PRN Reason: Nausea And Vomiting


   Last Admin: 05/15/20 01:38 Dose:  4 mg


   Documented by: 


Pantoprazole Sodium (Protonix)  40 mg PO DAILY [facility]


   Last Admin: 05/15/20 08:18 Dose:  40 mg


   Documented by: 








On examination:


VITAL SIGNS: 98.2, 63, 17, 116/57, 94% room air


GENERAL APPEARANCE: sitting up in bed, looking better. 


HEENT: Normal external appearance of nose and ear.  Oral cavity normal


EYES: Pupils equal. Conjunctiva normal. 


NECK: JVD not raised. Mass not palpable. 


RESPIRATORY: Respiratory effort normal. Lungs clear to auscultation. 


CARDIOVASCULAR: First and second sounds normal. No edema. 


ABDOMEN: Soft.  Decreased tenderness, no guarding or rigidity, pulse is 

presentLiver and spleen not palpable. No tenderness. No mass palpable. 


PSYCHIATRY: Alert and oriented x3. Mood and affect normal. 





INVESTIGATIONS, reviewed in the clinical context:


White count 8.1 hemoglobin 11.8 progression 3.5 creatinine 0.59





Previous testing:


white count 8.5


Liver ultrasound-left lobe is atrophic, some biliary ductal dilation present.  

biliary stent noted.hydropic gallbladder-suggestion of bili stent malfunction


CT abdomen and pelvis with contrast-dilated biliary tree with markedly dilated 

gallbladder suggestive of bile stent malfunction.  Wall thickening of the 

splenic flexure of the colon extending into the descending colon.  Cecum appears

to be normal.





Assessment:


-Acute descending colon colitis possibly ischemic-improving


-Hydropic gallbladder with the possibility of bili stent malfunction


-Persistent atrial fibrillation


-GERD


-Family osteoarthritis


-Scoliosis


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 

followed by radiation treatment.





Plan


Thank you looking better.  Continue with IV Flagyl and IV Zosyn.  On a full 

liquid diet.  Advance to a soft bland diet in the morning.

## 2020-05-15 NOTE — P.PN
Subjective


Progress Note Date: 05/15/20





CHIEF COMPLAINT: Abdominal pain, biliary stent





HISTORY OF PRESENT ILLNESS: Patient examined at the bedside with Dr. Bond.  

Patient is currently denying abdominal pain. She reports nausea. No vomiting. 

Passing flatus. Denies further rectal bleeding.  WBC 8.1.  Hemoglobin 11.8.





PHYSICAL EXAM: 


VITAL SIGNS: Reviewed.


GENERAL: Well-developed in no acute distress. 


HEENT:  No sclera icterus. Extraocular movements grossly intact.  Moist buccal 

mucosa. Head is atraumatic, normocephalic. 


ABDOMEN:  Soft.  Nondistended. Nontender. 


NEUROLOGIC: Alert and oriented. Cranial nerves II through XII grossly intact.





ASSESSMENT: 


1.  Acute colitis


2.  History of cholangiocarcinoma with previous stent placement





PLAN: 


Dr. Bond evaluated patient at the bedside. Continue treatment of colitis per

GI service. Continue diet as tolerated. 


No surgical intervention recommended at this time. Patient encouraged to follow 

[facility]





Nurse practitioner note has been reviewed by physician. Signing provider agrees 

with the documented findings, assessment, and plan of care. 











Objective





- Vital Signs


Vital signs: 


                                   Vital Signs











Temp  98.1 F   05/15/20 04:58


 


Pulse  65   05/15/20 04:58


 


Resp  16   05/15/20 04:58


 


BP  133/66   05/15/20 04:58


 


Pulse Ox  96   05/15/20 04:58








                                 Intake & Output











 05/14/20 05/15/20 05/15/20





 18:59 06:59 18:59


 


Intake Total  690 


 


Output Total  300 


 


Balance  390 


 


Intake:   


 


  Intake, IV Titration  600 





  Amount   


 


    Piperacillin-Tazobactam 3  100 





    .375 gm In Sodium   





    Chloride 0.9% 100 ml @ 25   





    mls/hr IVPB Q8H [facility] Rx#:   





    [id number]   


 


    Sodium Chloride 0.9% 1,  400 





    000 ml @ 100 mls/hr IV .   





    Q10H [facility] Rx#:[id number]   


 


    metroNIDAZOLE-NS   100 





    mg In Saline 1 100ml.bag   





    @ 100 mls/hr IVPB Q8H [facility]   





    Rx#:[id number]   


 


  Oral  90 


 


Output:   


 


  Urine  300 


 


Other:   


 


  Voiding Method Toilet Toilet Toilet


 


  # Voids 2 3 














- Labs


CBC & Chem 7: 


                                 05/15/20 00:57





                                 05/15/20 05:41


Labs: 


                  Abnormal Lab Results - Last 24 Hours (Table)











  05/14/20 05/14/20 05/14/20 Range/Units





  01:35 06:03 19:49 


 


RBC   3.67 L  3.79 L  (3.80-5.40)  m/uL


 


Neutrophils #  8.4 H    (1.3-7.7)  k/uL


 


Lymphocytes #  0.5 L  0.6 L  0.7 L  (1.0-4.8)  k/uL


 


Sodium     (137-145)  mmol/L


 


AST     (14-36)  U/L


 


ALT     (4-34)  U/L


 


Alkaline Phosphatase     ()  U/L


 


Total Protein     (6.3-8.2)  g/dL


 


Albumin     (3.5-5.0)  g/dL














  05/15/20 05/15/20 Range/Units





  00:57 05:41 


 


RBC  3.75 L   (3.80-5.40)  m/uL


 


Neutrophils #    (1.3-7.7)  k/uL


 


Lymphocytes #  0.6 L   (1.0-4.8)  k/uL


 


Sodium   136 L  (137-145)  mmol/L


 


AST   56 H  (14-36)  U/L


 


ALT   38 H  (4-34)  U/L


 


Alkaline Phosphatase   223 H  ()  U/L


 


Total Protein   5.3 L  (6.3-8.2)  g/dL


 


Albumin   2.8 L  (3.5-5.0)  g/dL

## 2020-05-16 VITALS
TEMPERATURE: 97.9 F | SYSTOLIC BLOOD PRESSURE: 111 MMHG | DIASTOLIC BLOOD PRESSURE: 66 MMHG | RESPIRATION RATE: 16 BRPM | HEART RATE: 79 BPM

## 2020-05-16 RX ADMIN — PANTOPRAZOLE SODIUM SCH MG: 40 TABLET, DELAYED RELEASE ORAL at 09:11

## 2020-05-16 RX ADMIN — POTASSIUM CHLORIDE SCH MLS/HR: 14.9 INJECTION, SOLUTION INTRAVENOUS at 09:11

## 2020-05-16 RX ADMIN — PIPERACILLIN AND TAZOBACTAM SCH MLS/HR: 3; .375 INJECTION, POWDER, FOR SOLUTION INTRAVENOUS at 03:04

## 2020-05-16 RX ADMIN — METOPROLOL TARTRATE SCH MG: 25 TABLET, FILM COATED ORAL at 09:11

## 2020-05-16 RX ADMIN — METRONIDAZOLE SCH MLS/HR: 500 INJECTION, SOLUTION INTRAVENOUS at 03:04

## 2020-05-16 RX ADMIN — POTASSIUM CHLORIDE SCH MLS/HR: 14.9 INJECTION, SOLUTION INTRAVENOUS at 03:03

## 2020-05-16 NOTE — P.DS
Providers


Date of admission: 


05/13/20 02:53





Expected date of discharge: 05/16/20


Attending physician: 


Wyatt Melo





Consults: 





                                        





05/13/20 02:54


Consult Physician Routine 


   Consulting Provider: Rusty Estrada


   Consult Reason/Comments: colitis


   Do you want consulting provider notified?: Yes





05/13/20 22:05


Consult Physician Urgent 


   Consulting Provider: John Bond


   Consult Reason/Comments: abdominal pain/? ischemia ,possilbe biliray stent 

malfunction and


                                                hydrop GB


   Do you want consulting provider notified?: Yes











Primary care physician: 


Patric [facility] Course: 





Presenting complaint:


Abdominal pain and diarrhea





History of presenting complaint:


This is a pleasant 79 years old female with past medical history of atrial fibr

illation on Eliquis, osteoarthritis, GERD, possible hepatitis B, cervical pain, 

occasional numbness in bilateral feet hiatal hernia, rectal prolapse, bile duct 

carcinoma.  Presents because of abdominal pain and diarrhea.  Patient started 

having abdominal pain for about one week within THE tendon progressively worse, 

and the left lower quadrant, non-radiating, felt like achy/colicky, about 8/10 

in severity on the presentation associated with nausea but no vomiting.  Patient

has also diarrhea for example yesterday she had 11-12 bouts of bowel movements, 

they were loose and watery with little blood and that she had 1 large bloody 

bowel movement associated with lightheadedness and decided to come to the 

hospital


Patient denies fever or chills, no coughing, no chest pain or dyspnea.


She's the patient's of Dr. Montoya, she follows up at [facility] for

her radiotherapy to her bile duct cancer, her stents was placed also at 

[facility].  She follows up with Dr. Murry for her A. fib.


She denies smoking alcohol or illicit drugs


Admitted with-acute descending colitis.  Patient was on Flagyl and Omnicef.  

Omnicef discontinued and switched to IV Zosyn.


Today-the much better.  No more abdominal pain.  No nausea vomiting.  No 

diarrhea.  Tolerated full liquid diet.  Care was discussed in detail.  Questions

were answered.  Discharged on oral antibiotics


Discussion and discharge planning more than 35 minutes








Consultation:


Dr. JONNA Reddy from GI


Dr. Bond from general surgery








On examination:


VITAL SIGNS: 97.9, 79, 16, 111/66, 98% on room air


GENERAL APPEARANCE: Laying in bed, comfortable. 


HEENT: Normal external appearance of nose and ear.  Oral cavity normal


EYES: Pupils equal. Conjunctiva normal. 


NECK: JVD not raised. Mass not palpable. 


RESPIRATORY: Respiratory effort normal. Lungs clear to auscultation. 


CARDIOVASCULAR: First and second sounds normal. No edema. 


ABDOMEN: Soft.  No tenderness, no guarding or rigidity, pulse is presentLiver 

and spleen not palpable. No tenderness. No mass palpable. 


PSYCHIATRY: Alert and oriented x3. Mood and affect normal. 





INVESTIGATIONS, reviewed in the clinical context:


White count 8.1 hemoglobin 11.8 progression 3.5 creatinine 0.59





Previous testing:


white count 8.5


Liver ultrasound-left lobe is atrophic, some biliary ductal dilation present.  

biliary stent noted.hydropic gallbladder-suggestion of bili stent malfunction


CT abdomen and pelvis with contrast-dilated biliary tree with markedly dilated 

gallbladder suggestive of bile stent malfunction.  Wall thickening of the 

splenic flexure of the colon extending into the descending colon.  Cecum appears

to be normal.





Assessment:


-Acute descending colon colitis possibly POA


-Hydropic gallbladder with the possibility of bili stent malfunction


-Persistent atrial fibrillation


-GERD


-Family osteoarthritis


-Scoliosis


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 

followed by radiation treatment.





Disposition:


Home





Patient Condition at Discharge: Stable





Plan - Discharge Summary


Discharge Rx Participant: No


New Discharge Prescriptions: 


New


   Amoxicillin/Potassium Clav [Augmentin 875-125 Tablet] 1 tab PO Q12HR #14 tab


   Dicyclomine [Bentyl] 10 mg PO QID PRN #20 cap


     PRN Reason: Dyspepsia


   metroNIDAZOLE [Flagyl] 250 mg PO Q8H #21 tab





Continue


   Apixaban [Eliquis] 5 mg PO BID #60 tab


   Pantoprazole Sodium [Protonix] 40 mg PO DAILY


   Metoprolol Tartrate [Lopressor] 12.5 mg PO BID


   Polyethylene Glycol 3350 [Miralax] 17 gm PO DAILY PRN


     PRN Reason: Constipation


   Multivitamins, Thera [Multivitamin (formulary)] 1 tab PO DAILY


   Acetaminophen [Tylenol] 500 mg PO Q8H PRN


     PRN Reason: Pain





Discontinued


   Docusate [Colace] 100 mg PO BID PRN


     PRN Reason: Constipation


Discharge Medication List





Apixaban [Eliquis] 5 mg PO BID #60 tab 07/27/17 [Rx]


Pantoprazole Sodium [Protonix] 40 mg PO DAILY 09/09/19 [History]


Metoprolol Tartrate [Lopressor] 12.5 mg PO BID 11/03/19 [History]


Acetaminophen [Tylenol] 500 mg PO Q8H PRN 05/13/20 [History]


Multivitamins, Thera [Multivitamin (formulary)] 1 tab PO DAILY 05/13/20 

[History]


Polyethylene Glycol 3350 [Miralax] 17 gm PO DAILY PRN 05/13/20 [History]


Amoxicillin/Potassium Clav [Augmentin 875-125 Tablet] 1 tab PO Q12HR #14 tab 

05/16/20 [Rx]


Dicyclomine [Bentyl] 10 mg PO QID PRN #20 cap 05/16/20 [Rx]


metroNIDAZOLE [Flagyl] 250 mg PO Q8H #21 tab 05/16/20 [Rx]








Follow up Appointment(s)/Referral(s): 


Patric Montoya DO [Primary Care Provider] - 1-2 days (pt to call office on 

Monday for follow up appt)


Alexandra Reddy MD [STAFF PHYSICIAN] - 2 Weeks (pt to call office on Monday for 

follow up appt)


Patient Instructions/Handouts:  Dicyclomine (By mouth), Metronidazole (By 

mouth), Amoxicillin/Clavulanate Potassium (By mouth), Gastrointestinal Bleeding 

(DC), Low Fiber Diet (DC)


Activity/Diet/Wound Care/Special Instructions: 


Low fiber diet; avoid too much fresh fruit or vegetable for the first 2 weeks 

and avoid dairy.


Discharge Disposition: HOME SELF-CARE

## 2020-05-16 NOTE — P.PN
Subjective


Progress Note Date: 05/15/20


Principal diagnosis: 





Colitis, cholangiocarcinoma, abdominal pain





Patient is seen lying in bed reporting that she is feeling better.  Less 

abdominal pain with no blood with bowel movement today.





Objective





- Vital Signs


Vital signs: 


                                   Vital Signs











Temp  98.1 F   05/15/20 04:58


 


Pulse  65   05/15/20 04:58


 


Resp  16   05/15/20 04:58


 


BP  133/66   05/15/20 04:58


 


Pulse Ox  96   05/15/20 04:58








                                 Intake & Output











 05/14/20 05/15/20 05/15/20





 18:59 06:59 18:59


 


Intake Total  690 


 


Output Total  300 


 


Balance  390 


 


Intake:   


 


  Intake, IV Titration  600 





  Amount   


 


    Piperacillin-Tazobactam 3  100 





    .375 gm In Sodium   





    Chloride 0.9% 100 ml @ 25   





    mls/hr IVPB Q8H [facility] Rx#:   





    [id number]   


 


    Sodium Chloride 0.9% 1,  400 





    000 ml @ 100 mls/hr IV .   





    Q10H [facility] Rx#:[id number]   


 


    metroNIDAZOLE-NS   100 





    mg In Saline 1 100ml.bag   





    @ 100 mls/hr IVPB Q8H [facility]   





    Rx#:[id number]   


 


  Oral  90 


 


Output:   


 


  Urine  300 


 


Other:   


 


  Voiding Method Toilet Toilet Toilet


 


  # Voids 2 3 














- Exam





On physical examination, patient appears comfortable in no apparent distress. 


HEAD: Normocephalic, atraumatic. 


EYES: No scleral icterus. No conjunctival injection. 


MOUTH: No lesions, tongue midline. 


NECK: Trachea midline, no gross abnormalities. 


CHEST: Clear to auscultation with no wheezing or rhonchi appreciated. 


ABDOMEN: Soft. Bowel sounds are positive. No organomegaly.  No guarding or 

rigidity.


EXTREMITIES: No pedal edema. 


SKIN: No rashes, no jaundice. 


NEUROLOGIC: Alert and oriented x3.  No focal deficits. 





- Labs


CBC & Chem 7: 


                                 05/15/20 00:57





                                 05/15/20 05:41


Labs: 


                  Abnormal Lab Results - Last 24 Hours (Table)











  05/14/20 05/14/20 05/14/20 Range/Units





  01:35 06:03 19:49 


 


RBC   3.67 L  3.79 L  (3.80-5.40)  m/uL


 


Neutrophils #  8.4 H    (1.3-7.7)  k/uL


 


Lymphocytes #  0.5 L  0.6 L  0.7 L  (1.0-4.8)  k/uL


 


Sodium     (137-145)  mmol/L


 


AST     (14-36)  U/L


 


ALT     (4-34)  U/L


 


Alkaline Phosphatase     ()  U/L


 


Total Protein     (6.3-8.2)  g/dL


 


Albumin     (3.5-5.0)  g/dL














  05/15/20 05/15/20 Range/Units





  00:57 05:41 


 


RBC  3.75 L   (3.80-5.40)  m/uL


 


Neutrophils #    (1.3-7.7)  k/uL


 


Lymphocytes #  0.6 L   (1.0-4.8)  k/uL


 


Sodium   136 L  (137-145)  mmol/L


 


AST   56 H  (14-36)  U/L


 


ALT   38 H  (4-34)  U/L


 


Alkaline Phosphatase   223 H  ()  U/L


 


Total Protein   5.3 L  (6.3-8.2)  g/dL


 


Albumin   2.8 L  (3.5-5.0)  g/dL














Assessment and Plan


(1) Abdominal pain


Status: Acute   Code(s): R10.9 - UNSPECIFIED ABDOMINAL PAIN   SNOMED Code(s): 

41290471


   





(2) Colitis


Narrative/Plan: 


79-year-old female who presents to the hospital with complaints of severe lower 

abdominal pain followed by diarrhea and bloody bowel movements.  Computed 

tomography scan of the abdomen showed thickening of the descending colon up to 

the splenic flexure with inspiration for ischemic colitis, less likely 

infectious colitis or inflammatory process.  No further episodes of bleeding 

however pain has continued.  Patient has a history of cholangiocarcinoma treated

with ERCP and stent placement as well as radiation therapy at the [facility].


Status: Acute   Code(s): K52.9 - NONINFECTIVE GASTROENTERITIS AND COLITIS, 

UNSPECIFIED   SNOMED Code(s): 85685650


   


Plan: 





Supportive care


Okay for diet as tolerated


Bentyl added as needed for abdominal pain


Continue broad-spectrum antibiotic therapy


Continue to monitor CBC, BMP, LFTs


Continue to monitor her symptomatically


Thank you for allowing us to despite in the care of the patient we will continue

to follow

## 2020-05-16 NOTE — P.PN
Subjective


Progress Note Date: 05/16/20











CHIEF COMPLAINT: Abdominal pain





HISTORY OF PRESENT ILLNESS: The patient is an 79-year-old female with history of

abdominal pain and history of biliary stent including blood in stools. She has 

history of cholangiocarcinoma with stent placement and sees providers from [facility]. Her abdominal pain has improved.





ROS: No reports of nausea and vomiting.  No bowel movements.  No fevers or 

chills.  No new chest pain.  No productive sputum





PHYSICAL EXAM: 


VITAL SIGNS: Reviewed


CONSTITUTIONAL:  Well developed and in no acute distress. 


EYES:  Conjuctivae without sclera icterus. Extraocular movements grossly intact.




HEAD, EARS, NOSE, THROAT: Moist buccal mucosa. Head is atraumatic, 

normocephalic. Hears conversational speech. No nasal drainage.


NECK:  Supple. No thyroidomegaly.


RESPIRATORY:  Non-labored respirations and equal bilateral excursions.


CARDIOVASCULAR:  Palpable 2+ radial pulses.  


ABDOMEN: No peritonitis. 


MUSCULOSKELETAL:  No gross deformity of the lower extremities noted.  No cl

ubbing.  No cyanosis.


SKIN: Good skin turgor. Well perfused. 


NEUROLOGIC: Cranial nerves II through XII grossly intact.  No focal or 

lateralizing signs. 


PSYCH:  Appropriate affect.  Alert and oriented to person, place and time. 





CLINICAL LABS: No new labs





ASSESSMENT: 


1.  Abdominal pain


2.  Cholangiocarcinoma





PLAN: 


1.  She reports her abdominal pain is better.


2.  Stable for discharge.





Objective





- Vital Signs


Vital signs: 


                                   Vital Signs











Temp  97.9 F   05/16/20 12:25


 


Pulse  79   05/16/20 12:25


 


Resp  16   05/16/20 12:25


 


BP  111/66   05/16/20 12:25


 


Pulse Ox  98   05/16/20 12:25








                                 Intake & Output











 05/15/20 05/16/20 05/16/20





 18:59 06:59 18:59


 


Other:   


 


  Voiding Method Toilet Toilet Toilet


 


  # Voids 3 2 


 


  # Bowel Movements 2  














- Labs


CBC & Chem 7: 


                                 05/15/20 00:57





                                 05/15/20 05:41


Labs: 


                  Abnormal Lab Results - Last 24 Hours (Table)











  05/15/20 Range/Units





  09:45 


 


Stool Lactoferrin  POSITIVE H  (NEGATIVE)  








                      Microbiology - Last 24 Hours (Table)











 05/15/20 09:45 Stool Culture - Preliminary





 Stool

## 2020-08-20 ENCOUNTER — HOSPITAL ENCOUNTER (EMERGENCY)
Dept: HOSPITAL 47 - EC | Age: 80
Discharge: HOME | End: 2020-08-20
Payer: MEDICARE

## 2020-08-20 VITALS — TEMPERATURE: 98.3 F | RESPIRATION RATE: 18 BRPM

## 2020-08-20 VITALS — HEART RATE: 80 BPM | SYSTOLIC BLOOD PRESSURE: 153 MMHG | DIASTOLIC BLOOD PRESSURE: 65 MMHG

## 2020-08-20 DIAGNOSIS — K21.9: ICD-10-CM

## 2020-08-20 DIAGNOSIS — Z91.048: ICD-10-CM

## 2020-08-20 DIAGNOSIS — Z79.01: ICD-10-CM

## 2020-08-20 DIAGNOSIS — Z88.1: ICD-10-CM

## 2020-08-20 DIAGNOSIS — Z90.89: ICD-10-CM

## 2020-08-20 DIAGNOSIS — Z90.710: ICD-10-CM

## 2020-08-20 DIAGNOSIS — K59.00: Primary | ICD-10-CM

## 2020-08-20 DIAGNOSIS — I48.91: ICD-10-CM

## 2020-08-20 DIAGNOSIS — F41.9: ICD-10-CM

## 2020-08-20 DIAGNOSIS — Z79.899: ICD-10-CM

## 2020-08-20 LAB
ALBUMIN SERPL-MCNC: 3.8 G/DL (ref 3.5–5)
ALP SERPL-CCNC: 150 U/L (ref 38–126)
ALT SERPL-CCNC: 23 U/L (ref 4–34)
AMYLASE SERPL-CCNC: 68 U/L (ref 30–110)
ANION GAP SERPL CALC-SCNC: 5 MMOL/L
AST SERPL-CCNC: 39 U/L (ref 14–36)
BASOPHILS # BLD AUTO: 0.1 K/UL (ref 0–0.2)
BASOPHILS NFR BLD AUTO: 1 %
BUN SERPL-SCNC: 10 MG/DL (ref 7–17)
CALCIUM SPEC-MCNC: 9 MG/DL (ref 8.4–10.2)
CHLORIDE SERPL-SCNC: 106 MMOL/L (ref 98–107)
CO2 SERPL-SCNC: 28 MMOL/L (ref 22–30)
EOSINOPHIL # BLD AUTO: 0.2 K/UL (ref 0–0.7)
EOSINOPHIL NFR BLD AUTO: 5 %
ERYTHROCYTE [DISTWIDTH] IN BLOOD BY AUTOMATED COUNT: 4.09 M/UL (ref 3.8–5.4)
ERYTHROCYTE [DISTWIDTH] IN BLOOD: 13.7 % (ref 11.5–15.5)
GLUCOSE SERPL-MCNC: 104 MG/DL (ref 74–99)
HCT VFR BLD AUTO: 38.6 % (ref 34–46)
HGB BLD-MCNC: 12.7 GM/DL (ref 11.4–16)
LYMPHOCYTES # SPEC AUTO: 0.8 K/UL (ref 1–4.8)
LYMPHOCYTES NFR SPEC AUTO: 16 %
MCH RBC QN AUTO: 31 PG (ref 25–35)
MCHC RBC AUTO-ENTMCNC: 32.8 G/DL (ref 31–37)
MCV RBC AUTO: 94.4 FL (ref 80–100)
MONOCYTES # BLD AUTO: 0.5 K/UL (ref 0–1)
MONOCYTES NFR BLD AUTO: 9 %
NEUTROPHILS # BLD AUTO: 3.4 K/UL (ref 1.3–7.7)
NEUTROPHILS NFR BLD AUTO: 66 %
PH UR: 7.5 [PH] (ref 5–8)
PLATELET # BLD AUTO: 182 K/UL (ref 150–450)
POTASSIUM SERPL-SCNC: 3.7 MMOL/L (ref 3.5–5.1)
PROT SERPL-MCNC: 6.6 G/DL (ref 6.3–8.2)
SODIUM SERPL-SCNC: 139 MMOL/L (ref 137–145)
SP GR UR: 1 (ref 1–1.03)
UROBILINOGEN UR QL STRIP: <2 MG/DL (ref ?–2)
WBC # BLD AUTO: 5.1 K/UL (ref 3.8–10.6)

## 2020-08-20 PROCEDURE — 82272 OCCULT BLD FECES 1-3 TESTS: CPT

## 2020-08-20 PROCEDURE — 84484 ASSAY OF TROPONIN QUANT: CPT

## 2020-08-20 PROCEDURE — 96361 HYDRATE IV INFUSION ADD-ON: CPT

## 2020-08-20 PROCEDURE — 99284 EMERGENCY DEPT VISIT MOD MDM: CPT

## 2020-08-20 PROCEDURE — 86850 RBC ANTIBODY SCREEN: CPT

## 2020-08-20 PROCEDURE — 86900 BLOOD TYPING SEROLOGIC ABO: CPT

## 2020-08-20 PROCEDURE — 86901 BLOOD TYPING SEROLOGIC RH(D): CPT

## 2020-08-20 PROCEDURE — 93005 ELECTROCARDIOGRAM TRACING: CPT

## 2020-08-20 PROCEDURE — 85025 COMPLETE CBC W/AUTO DIFF WBC: CPT

## 2020-08-20 PROCEDURE — 74177 CT ABD & PELVIS W/CONTRAST: CPT

## 2020-08-20 PROCEDURE — 96374 THER/PROPH/DIAG INJ IV PUSH: CPT

## 2020-08-20 PROCEDURE — 36415 COLL VENOUS BLD VENIPUNCTURE: CPT

## 2020-08-20 PROCEDURE — 80053 COMPREHEN METABOLIC PANEL: CPT

## 2020-08-20 PROCEDURE — 82150 ASSAY OF AMYLASE: CPT

## 2020-08-20 PROCEDURE — 83690 ASSAY OF LIPASE: CPT

## 2020-08-20 PROCEDURE — 81003 URINALYSIS AUTO W/O SCOPE: CPT

## 2020-08-20 NOTE — ED
Abdominal Pain HPI





- General


Chief Complaint: Abdominal Pain


Stated Complaint: abd pain


Time Seen by Provider: 20 14:41


Source: patient


Mode of arrival: ambulatory


Limitations: no limitations





- History of Present Illness


Initial Comments: 


79-year-old female with history of ulcerative colitis, PUD, atrial fibrillation 

on anticoagulation therapy, biliary cancer presents emergency department today 

for chief complaint of abdominal pain and constipation.  Patient states that she

has had no bowel movement 1 week she states she took laxative had large bowel 

movement today.  She that she had pain in the rectum and noted dark dark stool. 

Denies bloody stools. patient denies a fever and denies any nausea or vomiting. 

Patient states that she is also been lightheaded for the past 3 days.  Patient 

denies any chest pain shortness of breath. Patient denies cough or URI symptoms.

Patient describes the pain as sharp  coming and going of the lower abdomen, left

side over right. Patient has no additional complaint she appears well on 

arrival.








- Related Data


                                Home Medications











 Medication  Instructions  Recorded  Confirmed


 


Pantoprazole Sodium [Protonix] 40 mg PO DAILY 19


 


Metoprolol Tartrate [Lopressor] 12.5 mg PO BID 19


 


Acetaminophen [Tylenol] 1,000 mg PO Q6H PRN 20


 


polyethylene glycoL 3350 [Miralax] 17 gm PO DAILY PRN 20


 


ALPRAZolam [Xanax] 0.25 mg PO BID PRN 20


 


Docusate [Colace] 100 mg PO BID 20








                                  Previous Rx's











 Medication  Instructions  Recorded


 


Apixaban [Eliquis] 5 mg PO BID #60 tab 17


 


Magnesium Citrate [Citrate of 296 ml PO ONCE 1 Days #300 ml 20





Magnesia]  











                                    Allergies











Allergy/AdvReac Type Severity Reaction Status Date / Time


 


adhesive tape Allergy  Unknown Verified 20 16:53


 


cefuroxime Allergy  Itching Verified 20 16:53


 


levofloxacin [From Levaquin] Allergy  Rash/Hives Verified 20 16:53


 


Additives in inhalers AdvReac  Rapid Uncoded 20 01:08





   Heart Rate  














Review of Systems


ROS Statement: 


Those systems with pertinent positive or pertinent negative responses have been 

documented in the HPI.





ROS Other: All systems not noted in ROS Statement are negative.





Past Medical History


Past Medical History: Atrial Fibrillation, Cancer, GERD/Reflux, Osteoarthritis 

(OA)


Additional Past Medical History / Comment(s): Possible hepatitis B years ago, 

UTIs, cardiac murmur, scoliosis,  DJD, cervical pain d/t position, ocular 

migraines, occasional numbness/tingling to toes bilateral feet, hiatal hernia, 

rectal prolapse, bile duct carncinoma


History of Any Multi-Drug Resistant Organisms: None Reported


Past Surgical History: Appendectomy, Breast Surgery, Hernia Repair, Hysterectomy


Additional Past Surgical History / Comment(s): Umbilical hernia repair, EGD, 

colonoscopy, bilateral cataract removals/lens implants, L breast benign biopsy, 

ercp


Past Anesthesia/Blood Transfusion Reactions: Family History of Problems w/ 

Anesthesia, Postoperative Nausea & Vomiting (PONV)


Additional Past Anesthesia/Blood Transfusion Reaction / Comment(s): Pt states 

her mother's heart stopped during gallbladder surgery.


Past Psychological History: Anxiety


Smoking Status: Never smoker


Past Alcohol Use History: None Reported


Past Drug Use History: None Reported





- Past Family History


  ** Mother


Family Medical History: Coronary Artery Disease (CAD), CVA/TIA


Additional Family Medical History / Comment(s): Mother had cardiac valve 

disease.  She  from a postop CVA.





  ** Father


Additional Family Medical History / Comment(s): Father had myasthenia gravis.





General Exam





- General Exam Comments


Initial Comments: 


General:  The patient is awake and alert, in no distress


Eye:  +3 mm pupils are equal, round and reactive to light, extra-ocular 

movements are intact.  No nystagmus.  There is normal conjunctiva bilaterally.  

No signs of icterus.  


Ears, nose, mouth and throat:  There are moist mucous membranes and no oral 

lesions. 


Neck:  The neck is supple, there is no tenderness or JVD.  


Cardiovascular:  There is a regular rate and rhythm. No murmur, rub or gallop is

appreciated.


Respiratory:  Lungs are clear to auscultation, respirations are non-labored, ruslan

ath sounds are equal.  No wheezes, stridor, rales, or rhonchi.


Gastrointestinal:  Soft, non-distended, non-tender abdomen without masses or 

organomegaly noted. There is no rebound or guarding present. 


Musculoskeletal:  Normal ROM, no tenderness. diffuse mild appearing abdominal 

pain some focalization in the left lower quadrant Strength 5/5. Sensation 

intact. Radial pulses equal bilaterally 2+.  


Neurological:  A&O x 3. CN II-XII intact grossly, There are no obvious motor or 

sensory deficits. Coordination appears grossly intact. Speech is normal.


Skin:  Skin is warm and dry and no rashes or lesions are noted. 


Psychiatric:  Cooperative, appropriate mood & affect, normal judgment.  





Limitations: no limitations





Course


                                   Vital Signs











  20





  14:37 16:39


 


Temperature 98.3 F 


 


Pulse Rate 86 80


 


Respiratory 18 18





Rate  


 


Blood Pressure 132/77 153/65


 


O2 Sat by Pulse 99 98





Oximetry  














Medical Decision Making





- Medical Decision Making


79-year-old feel presented for chief complaint of abdominal pain and 

constipation patient states a week ago she had vomiting diarrhea that subsided 

she states that she has not a bowel movement for 5 days.  Patient states she had

a large wound which they however at some dark stool and rectal pain.  On rectal 

examination there is light tan stool.  Occult negative hemoglobin stable blood 

pressure stable patient does not appear toxic she is not tachycardic.  Patient 

has mild pain on examination and CT no acute findings.I discussed the case 

mentating provider Dr. Arroyo refmayda patient is stable for discharge with 

outpatient GI follow-up. patient will be discharged with a bowel regimen she 

states she currently has docusate L be prescribed magnesium citrate with 

instruction to increase fluids/fiber.patient is agreeable, we'll discharge at 

this time return parameters were discussed at length and patient verbalized 

understanding





ventricular rate 90 bpm, CT interval 184 ms, QR restoration 74 ms, QT/QTC 

354/433 ms.  This is normal sinus there is no ST elevation or depression as 

dictated changes and some artifact is noted. EKg reviewd by attending Dr. Arroyo








- Lab Data


Result diagrams: 


                                 20 15:16





                                 20 15:16


                                   Lab Results











  20 Range/Units





  15:16 15:16 15:16 


 


WBC  5.1    (3.8-10.6)  k/uL


 


RBC  4.09    (3.80-5.40)  m/uL


 


Hgb  12.7    (11.4-16.0)  gm/dL


 


Hct  38.6    (34.0-46.0)  %


 


MCV  94.4    (80.0-100.0)  fL


 


MCH  31.0    (25.0-35.0)  pg


 


MCHC  32.8    (31.0-37.0)  g/dL


 


RDW  13.7    (11.5-15.5)  %


 


Plt Count  182    (150-450)  k/uL


 


Neutrophils %  66    %


 


Lymphocytes %  16    %


 


Monocytes %  9    %


 


Eosinophils %  5    %


 


Basophils %  1    %


 


Neutrophils #  3.4    (1.3-7.7)  k/uL


 


Lymphocytes #  0.8 L    (1.0-4.8)  k/uL


 


Monocytes #  0.5    (0-1.0)  k/uL


 


Eosinophils #  0.2    (0-0.7)  k/uL


 


Basophils #  0.1    (0-0.2)  k/uL


 


Sodium    139  (137-145)  mmol/L


 


Potassium    3.7  (3.5-5.1)  mmol/L


 


Chloride    106  ()  mmol/L


 


Carbon Dioxide    28  (22-30)  mmol/L


 


Anion Gap    5  mmol/L


 


BUN    10  (7-17)  mg/dL


 


Creatinine    0.60  (0.52-1.04)  mg/dL


 


Est GFR (CKD-EPI)AfAm    >90  (>60 ml/min/1.73 sqM)  


 


Est GFR (CKD-EPI)NonAf    87  (>60 ml/min/1.73 sqM)  


 


Glucose    104 H  (74-99)  mg/dL


 


Calcium    9.0  (8.4-10.2)  mg/dL


 


Total Bilirubin    0.5  (0.2-1.3)  mg/dL


 


AST    39 H  (14-36)  U/L


 


ALT    23  (4-34)  U/L


 


Alkaline Phosphatase    150 H  ()  U/L


 


Troponin I     (0.000-0.034)  ng/mL


 


Total Protein    6.6  (6.3-8.2)  g/dL


 


Albumin    3.8  (3.5-5.0)  g/dL


 


Amylase    68  ()  U/L


 


Lipase    97  ()  U/L


 


Urine Color   Colorless   


 


Urine Appearance   Clear   (Clear)  


 


Urine pH   7.5   (5.0-8.0)  


 


Ur Specific Gravity   1.002   (1.001-1.035)  


 


Urine Protein   Negative   (Negative)  


 


Urine Glucose (UA)   Negative   (Negative)  


 


Urine Ketones   Negative   (Negative)  


 


Urine Blood   Negative   (Negative)  


 


Urine Nitrite   Negative   (Negative)  


 


Urine Bilirubin   Negative   (Negative)  


 


Urine Urobilinogen   <2.0   (<2.0)  mg/dL


 


Ur Leukocyte Esterase   Negative   (Negative)  


 


Stool Occult Blood     (Negative)  


 


Blood Type     


 


Blood Type Confirm     


 


Blood Type Recheck     


 


Bld Type Recheck Status     


 


Antibody Screen     


 


Spec Expiration Date     














  20 Range/Units





  15:16 15:16 15:21 


 


WBC     (3.8-10.6)  k/uL


 


RBC     (3.80-5.40)  m/uL


 


Hgb     (11.4-16.0)  gm/dL


 


Hct     (34.0-46.0)  %


 


MCV     (80.0-100.0)  fL


 


MCH     (25.0-35.0)  pg


 


MCHC     (31.0-37.0)  g/dL


 


RDW     (11.5-15.5)  %


 


Plt Count     (150-450)  k/uL


 


Neutrophils %     %


 


Lymphocytes %     %


 


Monocytes %     %


 


Eosinophils %     %


 


Basophils %     %


 


Neutrophils #     (1.3-7.7)  k/uL


 


Lymphocytes #     (1.0-4.8)  k/uL


 


Monocytes #     (0-1.0)  k/uL


 


Eosinophils #     (0-0.7)  k/uL


 


Basophils #     (0-0.2)  k/uL


 


Sodium     (137-145)  mmol/L


 


Potassium     (3.5-5.1)  mmol/L


 


Chloride     ()  mmol/L


 


Carbon Dioxide     (22-30)  mmol/L


 


Anion Gap     mmol/L


 


BUN     (7-17)  mg/dL


 


Creatinine     (0.52-1.04)  mg/dL


 


Est GFR (CKD-EPI)AfAm     (>60 ml/min/1.73 sqM)  


 


Est GFR (CKD-EPI)NonAf     (>60 ml/min/1.73 sqM)  


 


Glucose     (74-99)  mg/dL


 


Calcium     (8.4-10.2)  mg/dL


 


Total Bilirubin     (0.2-1.3)  mg/dL


 


AST     (14-36)  U/L


 


ALT     (4-34)  U/L


 


Alkaline Phosphatase     ()  U/L


 


Troponin I   <0.012   (0.000-0.034)  ng/mL


 


Total Protein     (6.3-8.2)  g/dL


 


Albumin     (3.5-5.0)  g/dL


 


Amylase     ()  U/L


 


Lipase     ()  U/L


 


Urine Color     


 


Urine Appearance     (Clear)  


 


Urine pH     (5.0-8.0)  


 


Ur Specific Gravity     (1.001-1.035)  


 


Urine Protein     (Negative)  


 


Urine Glucose (UA)     (Negative)  


 


Urine Ketones     (Negative)  


 


Urine Blood     (Negative)  


 


Urine Nitrite     (Negative)  


 


Urine Bilirubin     (Negative)  


 


Urine Urobilinogen     (<2.0)  mg/dL


 


Ur Leukocyte Esterase     (Negative)  


 


Stool Occult Blood    Negative  (Negative)  


 


Blood Type  A Positive    


 


Blood Type Confirm     


 


Blood Type Recheck  No Previous Record    


 


Bld Type Recheck Status  CABO Indicated    


 


Antibody Screen  NEGATIVE    


 


Spec Expiration Date  2020 - 23 Range/Units





  16:38 


 


WBC   (3.8-10.6)  k/uL


 


RBC   (3.80-5.40)  m/uL


 


Hgb   (11.4-16.0)  gm/dL


 


Hct   (34.0-46.0)  %


 


MCV   (80.0-100.0)  fL


 


MCH   (25.0-35.0)  pg


 


MCHC   (31.0-37.0)  g/dL


 


RDW   (11.5-15.5)  %


 


Plt Count   (150-450)  k/uL


 


Neutrophils %   %


 


Lymphocytes %   %


 


Monocytes %   %


 


Eosinophils %   %


 


Basophils %   %


 


Neutrophils #   (1.3-7.7)  k/uL


 


Lymphocytes #   (1.0-4.8)  k/uL


 


Monocytes #   (0-1.0)  k/uL


 


Eosinophils #   (0-0.7)  k/uL


 


Basophils #   (0-0.2)  k/uL


 


Sodium   (137-145)  mmol/L


 


Potassium   (3.5-5.1)  mmol/L


 


Chloride   ()  mmol/L


 


Carbon Dioxide   (22-30)  mmol/L


 


Anion Gap   mmol/L


 


BUN   (7-17)  mg/dL


 


Creatinine   (0.52-1.04)  mg/dL


 


Est GFR (CKD-EPI)AfAm   (>60 ml/min/1.73 sqM)  


 


Est GFR (CKD-EPI)NonAf   (>60 ml/min/1.73 sqM)  


 


Glucose   (74-99)  mg/dL


 


Calcium   (8.4-10.2)  mg/dL


 


Total Bilirubin   (0.2-1.3)  mg/dL


 


AST   (14-36)  U/L


 


ALT   (4-34)  U/L


 


Alkaline Phosphatase   ()  U/L


 


Troponin I   (0.000-0.034)  ng/mL


 


Total Protein   (6.3-8.2)  g/dL


 


Albumin   (3.5-5.0)  g/dL


 


Amylase   ()  U/L


 


Lipase   ()  U/L


 


Urine Color   


 


Urine Appearance   (Clear)  


 


Urine pH   (5.0-8.0)  


 


Ur Specific Gravity   (1.001-1.035)  


 


Urine Protein   (Negative)  


 


Urine Glucose (UA)   (Negative)  


 


Urine Ketones   (Negative)  


 


Urine Blood   (Negative)  


 


Urine Nitrite   (Negative)  


 


Urine Bilirubin   (Negative)  


 


Urine Urobilinogen   (<2.0)  mg/dL


 


Ur Leukocyte Esterase   (Negative)  


 


Stool Occult Blood   (Negative)  


 


Blood Type   


 


Blood Type Confirm  A Positive  


 


Blood Type Recheck   


 


Bld Type Recheck Status   


 


Antibody Screen   


 


Spec Expiration Date   














Disposition


Clinical Impression: 


 Abdominal pain, Constipation





Disposition: HOME SELF-CARE


Condition: Good


Instructions (If sedation given, give patient instructions):  Abdominal Pain 

(ED)


Additional Instructions: 


Please use medication as discussed.  Please follow-up with family doctor in the 

next 2 days.  Please return to emergency room if the symptoms increase or worsen

or for any other concerns.


Prescriptions: 


Magnesium Citrate [Citrate of Magnesia] 296 ml PO ONCE 1 Days #300 ml


Is patient prescribed a controlled substance at d/c from ED?: No


Referrals: 


Patric Montoya DO [Primary Care Provider] - 1-2 days


Time of Disposition: 17:30

## 2020-08-20 NOTE — CT
EXAMINATION TYPE: CT abdomen pelvis w con

 

DATE OF EXAM: 8/20/2020

 

COMPARISON:

 

HISTORY: Abdominal pain and black stool.

 

CT DLP: 608.7 mGycm

Automated exposure control for dose reduction was used.

 

CONTRAST: 

Performed with IV Contrast, patient injected with 100 mL of Isovue 300.

 

Multiple axial sections were obtained from the diaphragm to the floor the pelvis with IV contrast.

 

There is mild subsegmental atelectasis at the lung bases. There is no pericardial effusion. There is 
no pleural effusion. There is moderate dilation of the gallbladder that measures 5.6 cm in diameter. 
Spleen is intact. There is small hiatal hernia. There is biliary stent. Liver shows no focal defect. 
There is no evidence of pancreatic mass. The intrahepatic bile ducts are not dilated.

 

There is no adrenal mass. Kidneys show satisfactory contrast opacification. There is no hydronephrosi
s. There is 1 cm cyst upper pole right kidney. Bladder distends smoothly. There is no inguinal hernia
. There is no free fluid in the pelvis.

 

There is thoracolumbar levoscoliosis. There is degenerative disc space narrowing at multiple levels o
f the lumbar spine. There is no retroperitoneal adenopathy. There is no evidence of a pelvic mass. Th
ere is small amount of free fluid in the pelvis.

 

There is no mesenteric edema. There is no ascites or free air. There is no bowel obstruction. Appendi
x is not seen. There is no sign of thickened appendix.

 

IMPRESSION:

Markedly dilated gallbladder unchanged compared to old exam and consistent with some chronic biliary 
obstruction. There is clearing of the wall thickening of the descending colon compared to old exam. T
here is very small amount of free fluid in the pelvis that is a change compared to old exam.

## 2020-09-14 ENCOUNTER — HOSPITAL ENCOUNTER (OUTPATIENT)
Dept: HOSPITAL 47 - LABWHC1 | Age: 80
Discharge: HOME | End: 2020-09-14
Attending: NURSE PRACTITIONER
Payer: MEDICARE

## 2020-09-14 DIAGNOSIS — C24.0: Primary | ICD-10-CM

## 2020-09-14 PROCEDURE — 86301 IMMUNOASSAY TUMOR CA 19-9: CPT

## 2020-09-14 PROCEDURE — 36415 COLL VENOUS BLD VENIPUNCTURE: CPT

## 2020-12-15 NOTE — US
EXAMINATION TYPE: US abdomen limited

 

DATE OF EXAM: 2/19/2018

 

COMPARISON: CT 10/20/2017, US 7/18/2017

 

CLINICAL HISTORY: 77-year-old female Pain, attention RUQ.

 

TECHNIQUE: Multiple sonographic images of the right upper quadrant are obtained.

 

FINDINGS:

 

Liver Length:  12.9 cm   

Gallbladder Wall:  0.2 cm   

CBD:  0.4 cm

Right Kidney:  10.2 x 4.1 x 4.0 cm

 

 

Pancreas:  Tail obscured by overlying bowel gas, visualized portions show no abnormality 

Liver:  Normal size and homogeneous appearance. No focal lesion seen.  

Gallbladder:  wnl

**Evidence for sonographic Light's sign:  Yes

CBD:  wnl as visualized, distal portion obscured by bowel gas 

Right Kidney:  Difficult/limited visualization due to overlying bowel gas. No hydronephrosis. Unable 
to visualize cystic area seen on CT with certainty.  

 

 

 

IMPRESSION: 

Some technical limitations due to bowel gas shadowing. No specific abnormality seen. However, note th
at the sonographer indicates a positive sonographic Light sign. This may reflect referred pain. If f
urther imaging evaluation of the gallbladder is desired, consider HIDA scan with ejection fraction.
Yes